# Patient Record
Sex: FEMALE | Race: WHITE | NOT HISPANIC OR LATINO | URBAN - METROPOLITAN AREA
[De-identification: names, ages, dates, MRNs, and addresses within clinical notes are randomized per-mention and may not be internally consistent; named-entity substitution may affect disease eponyms.]

---

## 2023-06-30 ENCOUNTER — OFFICE VISIT (OUTPATIENT)
Dept: PRIMARY CARE | Facility: CLINIC | Age: 28
End: 2023-06-30
Payer: COMMERCIAL

## 2023-06-30 VITALS
BODY MASS INDEX: 21.69 KG/M2 | HEART RATE: 93 BPM | SYSTOLIC BLOOD PRESSURE: 122 MMHG | HEIGHT: 66 IN | TEMPERATURE: 97.9 F | WEIGHT: 135 LBS | OXYGEN SATURATION: 99 % | DIASTOLIC BLOOD PRESSURE: 70 MMHG

## 2023-06-30 DIAGNOSIS — Z00.00 HEALTHCARE MAINTENANCE: Primary | ICD-10-CM

## 2023-06-30 DIAGNOSIS — F41.9 ANXIETY: ICD-10-CM

## 2023-06-30 DIAGNOSIS — Z12.4 CERVICAL CANCER SCREENING: ICD-10-CM

## 2023-06-30 LAB
POC APPEARANCE, URINE: CLEAR
POC BILIRUBIN, URINE: NEGATIVE
POC BLOOD, URINE: NEGATIVE
POC COLOR, URINE: YELLOW
POC GLUCOSE, URINE: NEGATIVE MG/DL
POC KETONES, URINE: NEGATIVE MG/DL
POC LEUKOCYTES, URINE: NEGATIVE
POC NITRITE,URINE: NEGATIVE
POC PH, URINE: 6 PH
POC PROTEIN, URINE: NEGATIVE MG/DL
POC SPECIFIC GRAVITY, URINE: >=1.03
POC UROBILINOGEN, URINE: 0.2 EU/DL

## 2023-06-30 PROCEDURE — 88175 CYTOPATH C/V AUTO FLUID REDO: CPT

## 2023-06-30 PROCEDURE — 99395 PREV VISIT EST AGE 18-39: CPT | Performed by: FAMILY MEDICINE

## 2023-06-30 PROCEDURE — 1036F TOBACCO NON-USER: CPT | Performed by: FAMILY MEDICINE

## 2023-06-30 PROCEDURE — 81002 URINALYSIS NONAUTO W/O SCOPE: CPT | Performed by: FAMILY MEDICINE

## 2023-06-30 RX ORDER — ESCITALOPRAM OXALATE 20 MG/1
1 TABLET ORAL DAILY
COMMUNITY
Start: 2018-06-11 | End: 2023-06-30 | Stop reason: SDUPTHER

## 2023-06-30 RX ORDER — ESCITALOPRAM OXALATE 20 MG/1
20 TABLET ORAL DAILY
Qty: 90 TABLET | Refills: 1 | Status: SHIPPED | OUTPATIENT
Start: 2023-06-30 | End: 2024-06-29

## 2023-06-30 RX ORDER — NORETHINDRONE ACETATE AND ETHINYL ESTRADIOL AND FERROUS FUMARATE 1MG-20(24)
1 KIT ORAL DAILY
Qty: 84 TABLET | Refills: 3 | Status: SHIPPED | OUTPATIENT
Start: 2023-06-30

## 2023-06-30 RX ORDER — NORETHINDRONE ACETATE AND ETHINYL ESTRADIOL AND FERROUS FUMARATE 1MG-20(24)
1 KIT ORAL DAILY
COMMUNITY
Start: 2020-12-29 | End: 2023-06-30 | Stop reason: SDUPTHER

## 2023-06-30 SDOH — ECONOMIC STABILITY: INCOME INSECURITY: IN THE LAST 12 MONTHS, WAS THERE A TIME WHEN YOU WERE NOT ABLE TO PAY THE MORTGAGE OR RENT ON TIME?: NO

## 2023-06-30 SDOH — ECONOMIC STABILITY: HOUSING INSECURITY
IN THE LAST 12 MONTHS, WAS THERE A TIME WHEN YOU DID NOT HAVE A STEADY PLACE TO SLEEP OR SLEPT IN A SHELTER (INCLUDING NOW)?: NO

## 2023-06-30 SDOH — ECONOMIC STABILITY: TRANSPORTATION INSECURITY
IN THE PAST 12 MONTHS, HAS LACK OF TRANSPORTATION KEPT YOU FROM MEETINGS, WORK, OR FROM GETTING THINGS NEEDED FOR DAILY LIVING?: NO

## 2023-06-30 SDOH — HEALTH STABILITY: PHYSICAL HEALTH: ON AVERAGE, HOW MANY MINUTES DO YOU ENGAGE IN EXERCISE AT THIS LEVEL?: 20 MIN

## 2023-06-30 SDOH — HEALTH STABILITY: PHYSICAL HEALTH: ON AVERAGE, HOW MANY DAYS PER WEEK DO YOU ENGAGE IN MODERATE TO STRENUOUS EXERCISE (LIKE A BRISK WALK)?: 5 DAYS

## 2023-06-30 SDOH — ECONOMIC STABILITY: TRANSPORTATION INSECURITY
IN THE PAST 12 MONTHS, HAS THE LACK OF TRANSPORTATION KEPT YOU FROM MEDICAL APPOINTMENTS OR FROM GETTING MEDICATIONS?: NO

## 2023-06-30 ASSESSMENT — SOCIAL DETERMINANTS OF HEALTH (SDOH)
WITHIN THE LAST YEAR, HAVE TO BEEN RAPED OR FORCED TO HAVE ANY KIND OF SEXUAL ACTIVITY BY YOUR PARTNER OR EX-PARTNER?: NO
HOW OFTEN DO YOU GET TOGETHER WITH FRIENDS OR RELATIVES?: TWICE A WEEK
IN A TYPICAL WEEK, HOW MANY TIMES DO YOU TALK ON THE PHONE WITH FAMILY, FRIENDS, OR NEIGHBORS?: TWICE A WEEK
WITHIN THE LAST YEAR, HAVE YOU BEEN AFRAID OF YOUR PARTNER OR EX-PARTNER?: NO
HOW OFTEN DO YOU ATTENT MEETINGS OF THE CLUB OR ORGANIZATION YOU BELONG TO?: NEVER
WITHIN THE LAST YEAR, HAVE YOU BEEN HUMILIATED OR EMOTIONALLY ABUSED IN OTHER WAYS BY YOUR PARTNER OR EX-PARTNER?: NO
HOW OFTEN DO YOU ATTEND CHURCH OR RELIGIOUS SERVICES?: NEVER
IN THE PAST 12 MONTHS, HAS THE ELECTRIC, GAS, OIL, OR WATER COMPANY THREATENED TO SHUT OFF SERVICE IN YOUR HOME?: NO
WITHIN THE LAST YEAR, HAVE YOU BEEN KICKED, HIT, SLAPPED, OR OTHERWISE PHYSICALLY HURT BY YOUR PARTNER OR EX-PARTNER?: NO
HOW HARD IS IT FOR YOU TO PAY FOR THE VERY BASICS LIKE FOOD, HOUSING, MEDICAL CARE, AND HEATING?: NOT HARD AT ALL
DO YOU BELONG TO ANY CLUBS OR ORGANIZATIONS SUCH AS CHURCH GROUPS UNIONS, FRATERNAL OR ATHLETIC GROUPS, OR SCHOOL GROUPS?: NO

## 2023-06-30 ASSESSMENT — LIFESTYLE VARIABLES
HOW OFTEN DO YOU HAVE A DRINK CONTAINING ALCOHOL: MONTHLY OR LESS
HOW MANY STANDARD DRINKS CONTAINING ALCOHOL DO YOU HAVE ON A TYPICAL DAY: PATIENT DOES NOT DRINK
HOW OFTEN DO YOU HAVE SIX OR MORE DRINKS ON ONE OCCASION: NEVER
AUDIT-C TOTAL SCORE: 1
SKIP TO QUESTIONS 9-10: 1

## 2023-06-30 ASSESSMENT — PATIENT HEALTH QUESTIONNAIRE - PHQ9
1. LITTLE INTEREST OR PLEASURE IN DOING THINGS: NOT AT ALL
2. FEELING DOWN, DEPRESSED OR HOPELESS: NOT AT ALL
SUM OF ALL RESPONSES TO PHQ9 QUESTIONS 1 & 2: 0

## 2023-06-30 NOTE — PATIENT INSTRUCTIONS
Today we performed your Annual Physical Exam.  Your preventative health care, labs and vaccinations have been reviewed and are up to date.      Your vaccines are up to date.    Follow up in 1 year for your Complete Physical Exam

## 2023-06-30 NOTE — PROGRESS NOTES
"Subjective   Patient ID: Zayda Dotson is a 27 y.o. female who presents for Annual Exam.    Dentist and Eye Doctor appointments: UTD  Immunizations: UTD  Diet: eats healthy  Exercise: walks her dog  Supplements: None  Menstrual Cycles:Regular  Sexually Active: Yes, Male, condoms  Pregnancy History: G0  Cancer Screening:    Cervical: Normal, ascus in the past    Breast:N/A   Colon: N/A   Skin:wears sunscreen   DEXA:n/a         HPI     Review of Systems    Objective   /70 (BP Location: Right arm, Patient Position: Sitting, BP Cuff Size: Adult)   Pulse 93   Temp 36.6 °C (97.9 °F) (Temporal)   Ht 1.676 m (5' 6\")   Wt 61.2 kg (135 lb)   LMP 06/12/2023 (Approximate)   SpO2 99%   Breastfeeding No   BMI 21.79 kg/m²     Physical Exam  Constitutional:       General: She is not in acute distress.     Appearance: She is well-developed. She is not diaphoretic.   HENT:      Head: Normocephalic and atraumatic.      Right Ear: Tympanic membrane normal.      Left Ear: Tympanic membrane normal.      Nose: Nose normal.      Mouth/Throat:      Mouth: Mucous membranes are moist.   Eyes:      General: No scleral icterus.     Pupils: Pupils are equal, round, and reactive to light.   Neck:      Thyroid: No thyromegaly.      Vascular: No JVD.   Cardiovascular:      Rate and Rhythm: Normal rate and regular rhythm.      Heart sounds: Normal heart sounds. No murmur heard.     No friction rub. No gallop.   Pulmonary:      Effort: Pulmonary effort is normal. No respiratory distress.      Breath sounds: Normal breath sounds. No wheezing or rales.   Chest:      Chest wall: No tenderness.   Abdominal:      General: Bowel sounds are normal. There is no distension.      Palpations: Abdomen is soft. There is no mass.      Tenderness: There is no abdominal tenderness. There is no rebound.   Musculoskeletal:         General: Normal range of motion.      Cervical back: Normal range of motion and neck supple.   Lymphadenopathy:      " Cervical: No cervical adenopathy.   Skin:     General: Skin is warm and dry.   Neurological:      General: No focal deficit present.      Mental Status: She is alert and oriented to person, place, and time.      Deep Tendon Reflexes: Reflexes normal.   Psychiatric:         Mood and Affect: Mood normal.         Behavior: Behavior normal.         Assessment/Plan   Diagnoses and all orders for this visit:  Healthcare maintenance  -     POCT UA (nonautomated) manually resulted  -     Junel Fe 24 1 mg-20 mcg (24)/75 mg (4) tablet; Take 1 tablet by mouth once daily.  Cervical cancer screening  -     THINPREP PAP TEST  Anxiety  -     escitalopram (Lexapro) 20 mg tablet; Take 1 tablet (20 mg) by mouth once daily.

## 2023-07-10 LAB
COMPLETE PATHOLOGY REPORT: NORMAL
CONVERTED CLINICAL DIAGNOSIS-HISTORY: NORMAL
CONVERTED DIAGNOSIS COMMENT: NORMAL
CONVERTED FINAL DIAGNOSIS: NORMAL
CONVERTED FINAL REPORT PDF LINK TO COPY AND PASTE: NORMAL

## 2023-11-02 ENCOUNTER — ANCILLARY PROCEDURE (OUTPATIENT)
Dept: RADIOLOGY | Facility: CLINIC | Age: 28
End: 2023-11-02
Payer: COMMERCIAL

## 2023-11-02 DIAGNOSIS — J06.9 ACUTE UPPER RESPIRATORY INFECTION: ICD-10-CM

## 2023-11-02 PROCEDURE — 71046 X-RAY EXAM CHEST 2 VIEWS: CPT | Performed by: RADIOLOGY

## 2023-11-02 PROCEDURE — 71046 X-RAY EXAM CHEST 2 VIEWS: CPT | Mod: FY

## 2024-07-01 ENCOUNTER — APPOINTMENT (OUTPATIENT)
Dept: PRIMARY CARE | Facility: CLINIC | Age: 29
End: 2024-07-01
Payer: COMMERCIAL

## 2024-07-01 ENCOUNTER — LAB (OUTPATIENT)
Dept: LAB | Facility: LAB | Age: 29
End: 2024-07-01
Payer: COMMERCIAL

## 2024-07-01 VITALS
HEART RATE: 73 BPM | WEIGHT: 136 LBS | OXYGEN SATURATION: 99 % | SYSTOLIC BLOOD PRESSURE: 102 MMHG | TEMPERATURE: 98.2 F | RESPIRATION RATE: 16 BRPM | DIASTOLIC BLOOD PRESSURE: 68 MMHG | HEIGHT: 66 IN | BODY MASS INDEX: 21.86 KG/M2

## 2024-07-01 DIAGNOSIS — F41.9 ANXIETY: ICD-10-CM

## 2024-07-01 DIAGNOSIS — Z00.00 HEALTHCARE MAINTENANCE: Primary | ICD-10-CM

## 2024-07-01 DIAGNOSIS — Z00.00 HEALTHCARE MAINTENANCE: ICD-10-CM

## 2024-07-01 LAB
25(OH)D3 SERPL-MCNC: 34 NG/ML (ref 30–100)
ALBUMIN SERPL BCP-MCNC: 4.6 G/DL (ref 3.4–5)
ALP SERPL-CCNC: 44 U/L (ref 33–110)
ALT SERPL W P-5'-P-CCNC: 11 U/L (ref 7–45)
ANION GAP SERPL CALC-SCNC: 12 MMOL/L (ref 10–20)
AST SERPL W P-5'-P-CCNC: 16 U/L (ref 9–39)
BILIRUB SERPL-MCNC: 0.7 MG/DL (ref 0–1.2)
BUN SERPL-MCNC: 14 MG/DL (ref 6–23)
CALCIUM SERPL-MCNC: 9.2 MG/DL (ref 8.6–10.3)
CHLORIDE SERPL-SCNC: 104 MMOL/L (ref 98–107)
CHOLEST SERPL-MCNC: 192 MG/DL (ref 0–199)
CHOLESTEROL/HDL RATIO: 2.4
CO2 SERPL-SCNC: 27 MMOL/L (ref 21–32)
CREAT SERPL-MCNC: 0.73 MG/DL (ref 0.5–1.05)
EGFRCR SERPLBLD CKD-EPI 2021: >90 ML/MIN/1.73M*2
ERYTHROCYTE [DISTWIDTH] IN BLOOD BY AUTOMATED COUNT: 12.6 % (ref 11.5–14.5)
GLUCOSE SERPL-MCNC: 74 MG/DL (ref 74–99)
HCT VFR BLD AUTO: 41.1 % (ref 36–46)
HDLC SERPL-MCNC: 78.6 MG/DL
HGB BLD-MCNC: 13.8 G/DL (ref 12–16)
LDLC SERPL CALC-MCNC: 95 MG/DL
MCH RBC QN AUTO: 31.4 PG (ref 26–34)
MCHC RBC AUTO-ENTMCNC: 33.6 G/DL (ref 32–36)
MCV RBC AUTO: 94 FL (ref 80–100)
NON HDL CHOLESTEROL: 113 MG/DL (ref 0–149)
NRBC BLD-RTO: 0 /100 WBCS (ref 0–0)
PLATELET # BLD AUTO: 197 X10*3/UL (ref 150–450)
POC APPEARANCE, URINE: CLEAR
POC BILIRUBIN, URINE: NEGATIVE
POC BLOOD, URINE: NEGATIVE
POC COLOR, URINE: YELLOW
POC GLUCOSE, URINE: NEGATIVE MG/DL
POC KETONES, URINE: NEGATIVE MG/DL
POC LEUKOCYTES, URINE: NEGATIVE
POC NITRITE,URINE: NEGATIVE
POC PH, URINE: 6 PH
POC PROTEIN, URINE: NEGATIVE MG/DL
POC SPECIFIC GRAVITY, URINE: 1.02
POC UROBILINOGEN, URINE: 0.2 EU/DL
POTASSIUM SERPL-SCNC: 4.8 MMOL/L (ref 3.5–5.3)
PROT SERPL-MCNC: 7.6 G/DL (ref 6.4–8.2)
RBC # BLD AUTO: 4.39 X10*6/UL (ref 4–5.2)
SODIUM SERPL-SCNC: 138 MMOL/L (ref 136–145)
TRIGL SERPL-MCNC: 93 MG/DL (ref 0–149)
TSH SERPL-ACNC: 2.44 MIU/L (ref 0.44–3.98)
VLDL: 19 MG/DL (ref 0–40)
WBC # BLD AUTO: 5.4 X10*3/UL (ref 4.4–11.3)

## 2024-07-01 PROCEDURE — 36415 COLL VENOUS BLD VENIPUNCTURE: CPT

## 2024-07-01 PROCEDURE — 84443 ASSAY THYROID STIM HORMONE: CPT

## 2024-07-01 PROCEDURE — 85027 COMPLETE CBC AUTOMATED: CPT

## 2024-07-01 PROCEDURE — 80053 COMPREHEN METABOLIC PANEL: CPT

## 2024-07-01 PROCEDURE — 81002 URINALYSIS NONAUTO W/O SCOPE: CPT | Performed by: FAMILY MEDICINE

## 2024-07-01 PROCEDURE — 1036F TOBACCO NON-USER: CPT | Performed by: FAMILY MEDICINE

## 2024-07-01 PROCEDURE — 82306 VITAMIN D 25 HYDROXY: CPT

## 2024-07-01 PROCEDURE — 99395 PREV VISIT EST AGE 18-39: CPT | Performed by: FAMILY MEDICINE

## 2024-07-01 PROCEDURE — 80061 LIPID PANEL: CPT

## 2024-07-01 RX ORDER — ESCITALOPRAM OXALATE 20 MG/1
20 TABLET ORAL DAILY
Qty: 90 TABLET | Refills: 3 | Status: SHIPPED | OUTPATIENT
Start: 2024-07-01 | End: 2025-07-01

## 2024-07-01 NOTE — PATIENT INSTRUCTIONS
Today we performed your Annual Physical Exam.  Your preventative health care, labs and vaccinations have been reviewed and are up to date.      Your vaccines are up to date.     Today we followed up on your anxiety.  You are doing great on your current medication.  Refills Sent.  Consider CBT/therapy to help with other underlying issues if needed.      Follow up in 1 year for your Complete Physical Exam

## 2024-07-01 NOTE — PROGRESS NOTES
"Subjective   Patient ID: Zayda Dotson is a 28 y.o. female who presents for Annual Exam.    Dentist and Eye Doctor appointments: UTD  Immunizations: UTD  Diet: Likes to eat healthy  Exercise: biking, walking her dog, stays active  Supplements: None  Menstrual Cycles: Regular  Sexually Active: Yes, Male, No STD concerns  Pregnancy History:g0  Cancer Screening:    Cervical: 2023 - due in 2026 with hpv cotesting   Breast: N/A   Colon: N/A   Skin: No concerns    DEXA:N/A        HPI     Review of Systems    Objective   /68   Pulse 73   Temp 36.8 °C (98.2 °F)   Resp 16   Ht 1.676 m (5' 6\")   Wt 61.7 kg (136 lb)   LMP 06/14/2024 Comment: last pap 6/2023 PCP  SpO2 99%   BMI 21.95 kg/m²     Physical Exam  Constitutional:       General: She is not in acute distress.     Appearance: She is well-developed. She is not diaphoretic.   HENT:      Head: Normocephalic and atraumatic.      Right Ear: Tympanic membrane normal.      Left Ear: Tympanic membrane normal.      Nose: Nose normal.      Mouth/Throat:      Mouth: Mucous membranes are moist.   Eyes:      General: No scleral icterus.     Pupils: Pupils are equal, round, and reactive to light.   Neck:      Thyroid: No thyromegaly.      Vascular: No JVD.   Cardiovascular:      Rate and Rhythm: Normal rate and regular rhythm.      Heart sounds: Normal heart sounds. No murmur heard.     No friction rub. No gallop.   Pulmonary:      Effort: Pulmonary effort is normal. No respiratory distress.      Breath sounds: Normal breath sounds. No wheezing or rales.   Chest:      Chest wall: No tenderness.   Breasts:     Right: Normal.      Left: Normal.   Abdominal:      General: Bowel sounds are normal. There is no distension.      Palpations: Abdomen is soft. There is no mass.      Tenderness: There is no abdominal tenderness. There is no rebound.   Musculoskeletal:         General: Normal range of motion.      Cervical back: Normal range of motion and neck supple. "   Lymphadenopathy:      Cervical: No cervical adenopathy.   Skin:     General: Skin is warm and dry.   Neurological:      General: No focal deficit present.      Mental Status: She is alert and oriented to person, place, and time.      Deep Tendon Reflexes: Reflexes normal.   Psychiatric:         Mood and Affect: Mood normal.         Behavior: Behavior normal.         Assessment/Plan   Diagnoses and all orders for this visit:  Healthcare maintenance  -     POCT UA (nonautomated) manually resulted  -     CBC; Future  -     Comprehensive Metabolic Panel; Future  -     Lipid Panel; Future  -     Vitamin D 25 hydroxy; Future  -     TSH with reflex to Free T4 if abnormal; Future  Anxiety  -     escitalopram (Lexapro) 20 mg tablet; Take 1 tablet (20 mg) by mouth once daily.

## 2024-07-18 ENCOUNTER — TELEPHONE (OUTPATIENT)
Dept: PRIMARY CARE | Facility: CLINIC | Age: 29
End: 2024-07-18
Payer: COMMERCIAL

## 2024-07-18 NOTE — TELEPHONE ENCOUNTER
Patient is going on a cruise and would like to know if you would send in Scopolamine Patches for her.    [facility]/pharmacy #[id number] AT [address]    [address] [address]  [facility] [address] [phone]

## 2024-07-19 DIAGNOSIS — T75.3XXS MOTION SICKNESS, SEQUELA: Primary | ICD-10-CM

## 2024-07-19 RX ORDER — SCOLOPAMINE TRANSDERMAL SYSTEM 1 MG/1
1 PATCH, EXTENDED RELEASE TRANSDERMAL
Qty: 10 PATCH | Refills: 0 | Status: SHIPPED | OUTPATIENT
Start: 2024-07-19 | End: 2024-09-17

## 2024-09-11 ENCOUNTER — TELEPHONE (OUTPATIENT)
Dept: PRIMARY CARE | Facility: CLINIC | Age: 29
End: 2024-09-11
Payer: COMMERCIAL

## 2024-09-11 NOTE — TELEPHONE ENCOUNTER
Patient left a message. She found out about a week ago that she is pregnant. She has her initial ob visit scheduled but is wondering if it is ok to continue taking Lexapro? If not, she would like to discuss other options.     Please advise

## 2024-10-03 ENCOUNTER — APPOINTMENT (OUTPATIENT)
Dept: OBSTETRICS AND GYNECOLOGY | Facility: CLINIC | Age: 29
End: 2024-10-03
Payer: COMMERCIAL

## 2024-10-03 ENCOUNTER — LAB (OUTPATIENT)
Dept: LAB | Facility: LAB | Age: 29
End: 2024-10-03
Payer: COMMERCIAL

## 2024-10-03 VITALS — BODY MASS INDEX: 21.14 KG/M2 | WEIGHT: 131 LBS | DIASTOLIC BLOOD PRESSURE: 83 MMHG | SYSTOLIC BLOOD PRESSURE: 142 MMHG

## 2024-10-03 DIAGNOSIS — Z34.91 FIRST TRIMESTER PREGNANCY (HHS-HCC): ICD-10-CM

## 2024-10-03 DIAGNOSIS — Z3A.08 8 WEEKS GESTATION OF PREGNANCY (HHS-HCC): ICD-10-CM

## 2024-10-03 DIAGNOSIS — O10.011 PRE-EXISTING ESSENTIAL HYPERTENSION DURING PREGNANCY IN FIRST TRIMESTER (HHS-HCC): ICD-10-CM

## 2024-10-03 DIAGNOSIS — F41.9 ANXIETY: ICD-10-CM

## 2024-10-03 DIAGNOSIS — Z34.01 ENCOUNTER FOR PRENATAL CARE IN FIRST TRIMESTER OF FIRST PREGNANCY (HHS-HCC): Primary | ICD-10-CM

## 2024-10-03 PROBLEM — R03.0 ELEVATED BLOOD PRESSURE READING IN OFFICE WITHOUT DIAGNOSIS OF HYPERTENSION: Status: ACTIVE | Noted: 2024-10-03

## 2024-10-03 PROBLEM — O99.340 ANXIETY DISORDER AFFECTING PREGNANCY, ANTEPARTUM (HHS-HCC): Status: ACTIVE | Noted: 2024-10-03

## 2024-10-03 LAB
ABO GROUP (TYPE) IN BLOOD: NORMAL
ALBUMIN SERPL BCP-MCNC: 4.4 G/DL (ref 3.4–5)
ALP SERPL-CCNC: 34 U/L (ref 33–110)
ALT SERPL W P-5'-P-CCNC: 8 U/L (ref 7–45)
ANION GAP SERPL CALC-SCNC: 10 MMOL/L (ref 10–20)
ANTIBODY SCREEN: NORMAL
AST SERPL W P-5'-P-CCNC: 12 U/L (ref 9–39)
BILIRUB SERPL-MCNC: 0.7 MG/DL (ref 0–1.2)
BUN SERPL-MCNC: 8 MG/DL (ref 6–23)
CALCIUM SERPL-MCNC: 9.4 MG/DL (ref 8.6–10.3)
CHLORIDE SERPL-SCNC: 101 MMOL/L (ref 98–107)
CO2 SERPL-SCNC: 28 MMOL/L (ref 21–32)
CREAT SERPL-MCNC: 0.55 MG/DL (ref 0.5–1.05)
EGFRCR SERPLBLD CKD-EPI 2021: >90 ML/MIN/1.73M*2
ERYTHROCYTE [DISTWIDTH] IN BLOOD BY AUTOMATED COUNT: 12.3 % (ref 11.5–14.5)
EST. AVERAGE GLUCOSE BLD GHB EST-MCNC: 68 MG/DL
GLUCOSE SERPL-MCNC: 68 MG/DL (ref 74–99)
HBA1C MFR BLD: 4 %
HBV SURFACE AG SERPL QL IA: NONREACTIVE
HCT VFR BLD AUTO: 40.4 % (ref 36–46)
HCV AB SER QL: NONREACTIVE
HGB BLD-MCNC: 13.8 G/DL (ref 12–16)
HIV 1+2 AB+HIV1 P24 AG SERPL QL IA: NONREACTIVE
MCH RBC QN AUTO: 31 PG (ref 26–34)
MCHC RBC AUTO-ENTMCNC: 34.2 G/DL (ref 32–36)
MCV RBC AUTO: 91 FL (ref 80–100)
NRBC BLD-RTO: 0 /100 WBCS (ref 0–0)
PLATELET # BLD AUTO: 223 X10*3/UL (ref 150–450)
POTASSIUM SERPL-SCNC: 4.3 MMOL/L (ref 3.5–5.3)
PREGNANCY TEST URINE, POC: POSITIVE
PROT SERPL-MCNC: 7.4 G/DL (ref 6.4–8.2)
RBC # BLD AUTO: 4.45 X10*6/UL (ref 4–5.2)
REFLEX ADDED, ANEMIA PANEL: NORMAL
RH FACTOR (ANTIGEN D): NORMAL
RUBV IGG SERPL IA-ACNC: 0.9 IA
RUBV IGG SERPL QL IA: NORMAL
SODIUM SERPL-SCNC: 135 MMOL/L (ref 136–145)
TREPONEMA PALLIDUM IGG+IGM AB [PRESENCE] IN SERUM OR PLASMA BY IMMUNOASSAY: NONREACTIVE
WBC # BLD AUTO: 6.9 X10*3/UL (ref 4.4–11.3)

## 2024-10-03 PROCEDURE — 87661 TRICHOMONAS VAGINALIS AMPLIF: CPT

## 2024-10-03 PROCEDURE — 87389 HIV-1 AG W/HIV-1&-2 AB AG IA: CPT

## 2024-10-03 PROCEDURE — 83021 HEMOGLOBIN CHROMOTOGRAPHY: CPT

## 2024-10-03 PROCEDURE — 36415 COLL VENOUS BLD VENIPUNCTURE: CPT

## 2024-10-03 PROCEDURE — 85027 COMPLETE CBC AUTOMATED: CPT

## 2024-10-03 PROCEDURE — 87491 CHLMYD TRACH DNA AMP PROBE: CPT

## 2024-10-03 PROCEDURE — 84156 ASSAY OF PROTEIN URINE: CPT

## 2024-10-03 PROCEDURE — 87340 HEPATITIS B SURFACE AG IA: CPT

## 2024-10-03 PROCEDURE — 83020 HEMOGLOBIN ELECTROPHORESIS: CPT

## 2024-10-03 PROCEDURE — 82570 ASSAY OF URINE CREATININE: CPT

## 2024-10-03 PROCEDURE — 86803 HEPATITIS C AB TEST: CPT

## 2024-10-03 PROCEDURE — 87086 URINE CULTURE/COLONY COUNT: CPT

## 2024-10-03 PROCEDURE — 87591 N.GONORRHOEAE DNA AMP PROB: CPT

## 2024-10-03 PROCEDURE — 86317 IMMUNOASSAY INFECTIOUS AGENT: CPT

## 2024-10-03 PROCEDURE — 86901 BLOOD TYPING SEROLOGIC RH(D): CPT

## 2024-10-03 PROCEDURE — 80053 COMPREHEN METABOLIC PANEL: CPT

## 2024-10-03 PROCEDURE — 86900 BLOOD TYPING SEROLOGIC ABO: CPT

## 2024-10-03 PROCEDURE — 83036 HEMOGLOBIN GLYCOSYLATED A1C: CPT

## 2024-10-03 PROCEDURE — 86850 RBC ANTIBODY SCREEN: CPT

## 2024-10-03 PROCEDURE — 86780 TREPONEMA PALLIDUM: CPT

## 2024-10-03 NOTE — PROGRESS NOTES
"Assessment/Plan     Routine Prenatal Care  - Patient appropriate for and desires midwifery service  - Oriented to practice, including available collaboration and consulting with physicians.  - Discussed routine OB labs, including serum STI/HIV, CBC, blood type and screen.   - Education provided r/t nutrition, folic acid supplementation, dietary guidelines, exercise, travel, smoking, alcohol, caffeine, and drug use.  - Dating ultrasound deferred; pt with sure LMP and history of regular menstrual cycles.  - Pt counseled on genetic testing options and provided literature in the obstetric folder. First line screening options, including cffDNA and NT ultrasound, were discussed and offered.  Benefits, drawbacks, and limitations explained. Pt interested in both - orders placed.   - Warning s/s discussed and SAB precautions reviewed. Pt provided office phone number and instructed on when to call.    Elevated BP without diagnosis of hypertension  - 142/83 today, pt denies known history of chronic hypertension  - baseline CMP, PCR added to routine OB labs    Nausea During Pregnancy  - Discussed lifestyle modifications including small frequent meals, keeping dry crackers at bedside to eat upon awakening  - Discussed complementary treatments including peppermint and ginger products   - Discussed Vitamin B6 and Unisom as needed, pt opts to purchase OTC    Pre-pregnancy BMI 20.99   - Normal weight in Pregnancy - BMI 18.5-24.9; weight gain of 25-35lbs through healthy eating habits reviewed. Recommended \"Real Food for Pregnancy\" by Edilma Chiacs.    ASA Prophylaxis  - Pt does not meet criteria for ASA prophylaxis. Will consider if BP remains elevated at subsequent visits.    Health Maintenance  - counseled on seatbelt use, continued bi-annual visits for dental care, and annual visits with PCP  - Flu: Not yet vaccinated for 0790-2708 season; pt accepts vaccination today    Anxiety and Depression   - Pt admits to situational stressors " "including demands at work, hormonal changes, and nausea and vomiting due to the pregnancy  - Mood stable on 20mg Lexapro daily; discussed risks / benefits of SSRI use during pregnancy. Referred to \"MothertoBaby\" website for additional information. Pt opts to continue Lexapro.   - Does not see therapist currently, accepts referral to OBGYN Behavioral Health    Cervical Cancer Screening  - PAP up to date  - ASCCP guidelines reviewed with patient; next PAP due 2026    F/U 4 weeks. Review results of NOB labs. Collect cffDNA. Ensure NT scan is scheduled. Send Rx for bASA and discuss implications of possible CHTN if BP remains elevated.     EDBBY Juarez-DAVID    Subjective     Zayda Dotson is a 29 y.o.  at 8w1d with a working estimated date of delivery of 2025, by Last Menstrual Period who presents for an initial prenatal visit. This pregnancy is planned.     Partner:  Kuldip  Employment: Teacher    Patient currently experiencing: Nausea, Vomiting    LMP: certain 24; Prior menstrual cycle regular q 28 days.   Bleeding or cramping since LMP: no  Taking prenatal vitamin: Yes  Ultrasound completed this pregnancy: No    OB History    Para Term  AB Living   1             SAB IAB Ectopic Multiple Live Births                  # Outcome Date GA Lbr Conor/2nd Weight Sex Type Anes PTL Lv   1 Current                 Prior pregnancy complications: N/A    Preeclampsia Risk  Moderate risk factors include: Nulliparity. High risk factors include: None.    PAP History: last PAP 23 nml    Past Medical History:   Diagnosis Date    Abnormal Pap smear of cervix 2017    Anxiety 2023    Depression       Past Surgical History:   Procedure Laterality Date    OTHER SURGICAL HISTORY  2020    No history of surgery      Social History     Tobacco Use    Smoking status: Never    Smokeless tobacco: Never   Vaping Use    Vaping status: Never Used   Substance Use Topics    Alcohol use: Not " Currently     Alcohol/week: 5.0 standard drinks of alcohol     Types: 5 Glasses of wine per week     Comment: socially    Drug use: Never      Current Medications: 20mg Lexapro, PNV    Objective     /83   Wt 59.4 kg (131 lb)   LMP 08/07/2024 Comment: last pap 6/2023 PCP  BMI 21.14 kg/m²     Physical Exam  Vitals reviewed.   Constitutional:       General: She is not in acute distress.     Appearance: Normal appearance.   HENT:      Head: Normocephalic and atraumatic.   Cardiovascular:      Rate and Rhythm: Normal rate and regular rhythm.      Heart sounds: Normal heart sounds, S1 normal and S2 normal.   Pulmonary:      Effort: Pulmonary effort is normal.      Breath sounds: Normal breath sounds.   Abdominal:      General: Abdomen is flat.      Palpations: Abdomen is soft.      Tenderness: There is no abdominal tenderness.      Comments: Gravid uterus   Musculoskeletal:         General: Normal range of motion.      Cervical back: Normal range of motion.   Skin:     General: Skin is warm and dry.   Neurological:      Mental Status: She is alert and oriented to person, place, and time.   Psychiatric:         Mood and Affect: Mood normal.         Behavior: Behavior normal.         Thought Content: Thought content normal.         Judgment: Judgment normal.        Postpartum Depression: Not on file      Pregnancy Problems (from 10/03/24 to present)       Problem Noted Resolved    8 weeks gestation of pregnancy ([facility]) 10/3/2024 by FREDDY Juarez No    Priority:  Medium      Overview Addendum 10/3/2024  8:49 AM by FREDDY Juarez     Desired provider in labor: [x] CNM  [] Physician  [x] Blood Products: [x] Yes, accepts [] No, needs counseling  [x] Initial BMI: 20.99   [] Prenatal Labs:   [x] Cervical Cancer Screening up to date: next due 6/2026  [] Rh status:   [] Genetic Screening:    [] NT US: (11-13 wks)  [] Baby ASA (if indicated):  [] Pregnancy dated by:     [] Anatomy US: (19-20  wks)  [] Federal Sterilization consent signed (if indicated):  [] 1hr GCT at 24-28wks:  [] Rhogam (if indicated):   [] Fetal Surveillance (if indicated):  [] Tdap (27-32 wks, may be given up to 36 wks if initial window missed):   [] RSV (32-36 wks) (Sept. to end of Jan):   [x] Flu Vaccine: 10/3    [] Breastfeeding:  [] Postpartum Birth control method:   [] GBS at 36 - 37 wks:  [] 39 weeks discussion of IOL vs. Expectant management:  [x] Mode of delivery ( anticipated ): Vaginal           Elevated blood pressure reading in office without diagnosis of hypertension 10/3/2024 by FREDDY Juarez No    Priority:  Medium      Overview Signed 10/3/2024  8:18 AM by FREDDY Juarez     /83 at NOB visit, 8w1d gestation  Baseline PEC labs ordered         Anxiety disorder affecting pregnancy, antepartum ([facility]) 10/3/2024 by FREDDY Juarez No    Priority:  Medium      Overview Signed 10/3/2024  8:50 AM by FREDDY Juarez     Pt takes 20mg Lexapro daily  Referred to OBGYN Behavioral Health

## 2024-10-04 LAB
CREAT UR-MCNC: 131.8 MG/DL (ref 20–320)
PROT UR-ACNC: 16 MG/DL (ref 5–24)
PROT/CREAT UR: 0.12 MG/MG CREAT (ref 0–0.17)

## 2024-10-05 PROBLEM — O26.899 RH NEGATIVE STATE IN ANTEPARTUM PERIOD (HHS-HCC): Status: ACTIVE | Noted: 2024-10-05

## 2024-10-05 PROBLEM — Z67.91 RH NEGATIVE STATE IN ANTEPARTUM PERIOD (HHS-HCC): Status: ACTIVE | Noted: 2024-10-05

## 2024-10-05 PROBLEM — Z78.9 NOT IMMUNE TO RUBELLA: Status: ACTIVE | Noted: 2024-10-05

## 2024-10-05 LAB
BACTERIA UR CULT: NORMAL
C TRACH RRNA SPEC QL NAA+PROBE: NEGATIVE
N GONORRHOEA DNA SPEC QL PROBE+SIG AMP: NEGATIVE
T VAGINALIS RRNA SPEC QL NAA+PROBE: NEGATIVE

## 2024-10-08 LAB
HEMOGLOBIN A2: 3 % (ref 2–3.5)
HEMOGLOBIN A: 96.8 % (ref 95.8–98)
HEMOGLOBIN F: 0.2 % (ref 0–2)
HEMOGLOBIN IDENTIFICATION INTERPRETATION: NORMAL
PATH REVIEW-HGB IDENTIFICATION: NORMAL

## 2024-10-31 ENCOUNTER — APPOINTMENT (OUTPATIENT)
Dept: OBSTETRICS AND GYNECOLOGY | Facility: CLINIC | Age: 29
End: 2024-10-31

## 2024-10-31 VITALS — WEIGHT: 137 LBS | DIASTOLIC BLOOD PRESSURE: 84 MMHG | BODY MASS INDEX: 22.11 KG/M2 | SYSTOLIC BLOOD PRESSURE: 128 MMHG

## 2024-10-31 DIAGNOSIS — Z3A.12 12 WEEKS GESTATION OF PREGNANCY (HHS-HCC): Primary | ICD-10-CM

## 2024-10-31 DIAGNOSIS — Z34.01 ENCOUNTER FOR PRENATAL CARE IN FIRST TRIMESTER OF FIRST PREGNANCY (HHS-HCC): ICD-10-CM

## 2024-10-31 PROCEDURE — 0501F PRENATAL FLOW SHEET: CPT

## 2024-11-05 ENCOUNTER — HOSPITAL ENCOUNTER (OUTPATIENT)
Dept: RADIOLOGY | Facility: HOSPITAL | Age: 29
Discharge: HOME | End: 2024-11-05
Payer: COMMERCIAL

## 2024-11-05 ENCOUNTER — APPOINTMENT (OUTPATIENT)
Dept: LAB | Facility: LAB | Age: 29
End: 2024-11-05
Payer: COMMERCIAL

## 2024-11-05 DIAGNOSIS — Z34.91 FIRST TRIMESTER PREGNANCY (HHS-HCC): ICD-10-CM

## 2024-11-05 PROCEDURE — 76813 OB US NUCHAL MEAS 1 GEST: CPT | Performed by: OBSTETRICS & GYNECOLOGY

## 2024-11-05 PROCEDURE — 76813 OB US NUCHAL MEAS 1 GEST: CPT

## 2024-11-06 ENCOUNTER — TELEPHONE (OUTPATIENT)
Dept: OBSTETRICS AND GYNECOLOGY | Facility: CLINIC | Age: 29
End: 2024-11-06

## 2024-11-06 NOTE — TELEPHONE ENCOUNTER
13.0 wk ob called ob line c/o vomiting every hour from 2 pm yesterday till around 10 pm last night.  She was able to sleep throughout the night.  This morning she attempted to push fluids to rehydrate but ended up getting sick within minutes of drinking.  Throughout the day she has been able to keep small sips of fluids down, a little each hour and has not vomited since this morning.  Patient denies diarrhea but isn't sure if she had a fever last night or not, as her thermometer isn't working.  She did have bouts of chills and then sweating throughout the night.  She is also experiencing a bad headache but thinks that is due to dehydration and not eating. Per patient, she is urinating like normal.    Patient encouraged to try pushing fluids with extra electrolytes in it, such as Pedialyte, Gatorade, propel or even liquid IV.      Patient advised to call office back with any further questions or concern.    Sent the following TRUE linkswear message as well:    I'm sorry to hear your not feeling well, hopefully you start feeling better come tomorrow.      It's important to try to push fluids to prevent dehydration.  Drinking something with extra electrolytes such as Propel, Gatorade, PowerAde, Pedialyte, Liquid IV are great but you can also drink Ginger ale or even Sprite.  I would try sipping on fluids at room temperature, as opposed to super cold to avoid shocking your stomach causing you to get sick again.       If you do start experiencing diarrhea, you can take Imodium, per box instructions, to help get your diarrhea under control.  Once you feel like you are able to eat something I would stick with the BRAT diet, which is Bananas; Rice; Applesauce and Toast.  These foods are helpful on replenishing what you loose when having diarrhea.       You can take tylenol, per box instructions for fever and/or headache.     As we discussed, headaches can be due to multiple reasons such as weather, low blood sugar, dehydration,  pregnancy hormones or even blood pressure.     You should make sure you are drinking at least 2-3 liters of water a day, to stay hydrated.  If you normally drink caffeine and have stopped due to pregnancy, this may be a caffeine withdrawal headache.  Caffeine is ok to have in pregnancy as long as you don't have more than 250 mg a day.       You can take the following:     Magnesium 400 mg daily with prenatal vitamins to prevent reoccurring   If you still experience a headache, you can take Tylenol 1000 mg plus over the counter Magnesium 800 mg with a cup of coffee (an isolated cup of coffee/pop is safe and recommended for headache support measures)   Tylenol 1000mg every 6 hours or per box instructions  If Tylenol isn't helping that you can take Regular Excedrin or Excedrin Tension     Hopefully you start feeling better soon.     Please reach out with any questions.     Nu Eastman  OB Navigator

## 2024-11-14 LAB
COMMENTS - MP RESULT TYPE: NORMAL
SCAN RESULT: NORMAL

## 2024-11-26 ENCOUNTER — APPOINTMENT (OUTPATIENT)
Dept: OBSTETRICS AND GYNECOLOGY | Facility: CLINIC | Age: 29
End: 2024-11-26
Payer: COMMERCIAL

## 2024-11-26 VITALS — WEIGHT: 136 LBS | SYSTOLIC BLOOD PRESSURE: 114 MMHG | DIASTOLIC BLOOD PRESSURE: 68 MMHG | BODY MASS INDEX: 21.95 KG/M2

## 2024-11-26 DIAGNOSIS — Z3A.15 15 WEEKS GESTATION OF PREGNANCY (HHS-HCC): ICD-10-CM

## 2024-11-26 DIAGNOSIS — Z34.02 ENCOUNTER FOR PRENATAL CARE IN SECOND TRIMESTER OF FIRST PREGNANCY (HHS-HCC): Primary | ICD-10-CM

## 2024-11-26 PROCEDURE — 0501F PRENATAL FLOW SHEET: CPT

## 2024-11-26 NOTE — PROGRESS NOTES
Anatomy U/S scheduled for 24    Chaperone declined: Marilyn Briceno, CM3      Assessment/Plan   29 y.o.  at 15w6d  - reviewed results of NT scan and cffDNA, both wnl  - encouraged pt to review prenatal classes offered through   - Anatomy scan scheduled for   - Routine prenatal care    Follow up in 4 weeks for next prenatal visit    FREDDY Juarez    Subjective     Zayda Dotson is a 29 y.o.  at 15w6d with a working estimated date of delivery of 2025, by Last Menstrual Period presenting for a routine prenatal visit. She is doing well, no concerns. She denies vaginal bleeding, leakage of fluid, or cramping. Not feeling FM yet.     Had stomach bug first week in November. Better now.   Getting a little anxious about childbirth, the unknown.     Her pregnancy is complicated by:  Pregnancy Problems (from 10/03/24 to present)       Problem Noted Diagnosed Resolved    Rh negative state in antepartum period ([facility]) 10/5/2024 by FREDDY Juarez  No    Priority:  Medium       Overview Signed 10/5/2024  1:45 PM by FREDDY Juarez     [] 28 week Rhogam         Not immune to rubella 10/5/2024 by FREDDY Juarez  No    Priority:  Medium       Overview Signed 10/5/2024  1:45 PM by FREDDY Juarez     Rubella equivocal  Will offer MMR postpartum         15 weeks gestation of pregnancy ([facility]) 10/3/2024 by FREDDY Juarez  No    Priority:  Medium       Overview Addendum 2024 10:22 AM by FREDDY Juarez     Desired provider in labor: [x] CNM  [] Physician  [x] Blood Products: [x] Yes, accepts [] No, needs counseling  [x] Initial BMI: 20.99   [x] Prenatal Labs: wnl with exception of rubella equivocal status  [x] Cervical Cancer Screening up to date: next due 2026  [x] Rh status: Neg  [x] Genetic Screening: cffdna wnl, girl  [x] NT US: (11-13 wks) 0.8mm wnl  [] Baby ASA (if indicated):  [x] Pregnancy dated by: LMP = 12w6d  scan    [] Anatomy US: (19-20 wks)  [] Federal Sterilization consent signed (if indicated):  [] 1hr GCT at 24-28wks:  [] Rhogam (if indicated):   [] Fetal Surveillance (if indicated):  [] Tdap (27-32 wks, may be given up to 36 wks if initial window missed):   [] RSV (32-36 wks) (Sept. to end ):   [x] Flu Vaccine: 10/3    [] Breastfeeding:  [] Postpartum Birth control method:   [] GBS at 36 - 37 wks:  [] 39 weeks discussion of IOL vs. Expectant management:  [x] Mode of delivery ( anticipated ): Vaginal           Elevated blood pressure reading in office without diagnosis of hypertension 10/3/2024 by FREDDY Juarez  No    Priority:  Medium       Overview Addendum 10/5/2024  1:45 PM by FREDDY Juarez     /83 at NOB visit, 8w1d gestation  Baseline PEC labs wnl         Anxiety disorder affecting pregnancy, antepartum ([facility]) 10/3/2024 by FREDDY Juarez  No    Priority:  Medium       Overview Signed 10/3/2024  8:50 AM by FREDDY Juarez     Pt takes 20mg Lexapro daily  Referred to OBGYN Behavioral Health                  Objective   Weight: 61.7 kg (136 lb)  TW.722 kg (6 lb)   Expected Total Weight Gain: 11.5 kg (25 lb)-16 kg (35 lb)   Pregravid BMI: 20.99  Pregravid Weight: 59 kg (130 lb)   BP: 114/68  Fetal Heart Rate: 141

## 2024-12-23 ENCOUNTER — HOSPITAL ENCOUNTER (OUTPATIENT)
Dept: RADIOLOGY | Facility: HOSPITAL | Age: 29
Discharge: HOME | End: 2024-12-23
Payer: COMMERCIAL

## 2024-12-23 DIAGNOSIS — Z34.91 FIRST TRIMESTER PREGNANCY (HHS-HCC): ICD-10-CM

## 2024-12-23 PROBLEM — Z3A.19 19 WEEKS GESTATION OF PREGNANCY (HHS-HCC): Status: ACTIVE | Noted: 2024-10-03

## 2024-12-23 PROCEDURE — 76805 OB US >/= 14 WKS SNGL FETUS: CPT

## 2024-12-23 PROCEDURE — 76805 OB US >/= 14 WKS SNGL FETUS: CPT | Performed by: OBSTETRICS & GYNECOLOGY

## 2024-12-23 NOTE — PROGRESS NOTES
Assessment/Plan   29 y.o.  at 19w6d  - Reviewed results of anatomy scan yesterday, wnl  - Discussed upcoming GCT/CBC to be performed between 24-28 weeks gestation  - Discussed plans for  feeding; Pt plans to breastfeed, has paperwork for pump but forgot to bring today  - Routine prenatal care    Follow up in 4 weeks for next prenatal visit. Collect GCT/CBC if greater than 24 weeks. Sign paperwork for breastpump.    FREDDY Juarez    Subjective     Zayda Dotson is a 29 y.o.  at 19w6d with a working estimated date of delivery of 2025, by Last Menstrual Period presenting for a routine prenatal visit. She is doing well, no concerns. She denies vaginal bleeding, leakage of fluid, contractions, or decreased fetal movement.    Lots of travel today and tomorrow for [place].     Her pregnancy is complicated by:  Pregnancy Problems (from 10/03/24 to present)       Problem Noted Diagnosed Resolved    Rh negative state in antepartum period ([facility]) 10/5/2024 by FREDDY Juarez  No    Priority:  Medium       Overview Signed 10/5/2024  1:45 PM by FREDDY Juarez     [] 28 week Rhogam         Not immune to rubella 10/5/2024 by FREDDY Juarez  No    Priority:  Medium       Overview Signed 10/5/2024  1:45 PM by FREDDY Juarez     Rubella equivocal  Will offer MMR postpartum         19 weeks gestation of pregnancy ([facility]) 10/3/2024 by FREDDY Juarez  No    Priority:  Medium       Overview Addendum 2024 11:56 AM by FREDDY Juarez     Desired provider in labor: [x] CNM  [] Physician  [x] Blood Products: [x] Yes, accepts [] No, needs counseling  [x] Initial BMI: 20.99   [x] Prenatal Labs: wnl with exception of rubella equivocal status  [x] Cervical Cancer Screening up to date: next due 2026  [x] Rh status: Neg  [x] Genetic Screening: cffdna wnl, girl  [x] NT US: (11-13 wks) 0.8mm wnl  [x] Baby ASA (if indicated): not  indicated  [x] Pregnancy dated by: LMP = 12w6d scan    [] Anatomy US: (19-20 wks)  [] Federal Sterilization consent signed (if indicated):  [] 1hr GCT at 24-28wks:  [] Rhogam (if indicated):   [] Fetal Surveillance (if indicated):  [] Tdap (27-32 wks, may be given up to 36 wks if initial window missed):   [] RSV (32-36 wks) (Sept. to end ):   [x] Flu Vaccine: 10/3    [] Breastfeeding:  [] Postpartum Birth control method:   [] GBS at 36 - 37 wks:  [] 36 week [facility] consult visit  [] 39 weeks discussion of IOL vs. Expectant management:  [x] Mode of delivery ( anticipated ): Vaginal           Elevated blood pressure reading in office without diagnosis of hypertension 10/3/2024 by FREDDY Juarez  No    Priority:  Medium       Overview Addendum 10/5/2024  1:45 PM by FREDDY Juarez     /83 at NOB visit, 8w1d gestation  Baseline PEC labs wnl         Anxiety disorder affecting pregnancy, antepartum ([facility]) 10/3/2024 by FREDDY Juarez  No    Priority:  Medium       Overview Signed 10/3/2024  8:50 AM by FREDDY Juarez     Pt takes 20mg Lexapro daily  Referred to OBGYN Behavioral Health                  Objective   Weight: 64.4 kg (142 lb)  TW.443 kg (12 lb)   Expected Total Weight Gain: 11.5 kg (25 lb)-16 kg (35 lb)   Pregravid BMI: 20.99  Pregravid Weight: 59 kg (130 lb)   BP: 110/62  Fetal Heart Rate: 135 Fundal Height (cm): 19 cm

## 2024-12-24 ENCOUNTER — APPOINTMENT (OUTPATIENT)
Dept: OBSTETRICS AND GYNECOLOGY | Facility: CLINIC | Age: 29
End: 2024-12-24
Payer: COMMERCIAL

## 2024-12-24 VITALS — SYSTOLIC BLOOD PRESSURE: 110 MMHG | DIASTOLIC BLOOD PRESSURE: 62 MMHG | WEIGHT: 142 LBS | BODY MASS INDEX: 22.92 KG/M2

## 2024-12-24 DIAGNOSIS — Z34.02 ENCOUNTER FOR PRENATAL CARE IN SECOND TRIMESTER OF FIRST PREGNANCY (HHS-HCC): ICD-10-CM

## 2024-12-24 DIAGNOSIS — Z3A.19 19 WEEKS GESTATION OF PREGNANCY (HHS-HCC): Primary | ICD-10-CM

## 2024-12-24 PROCEDURE — 0501F PRENATAL FLOW SHEET: CPT

## 2025-01-21 ENCOUNTER — APPOINTMENT (OUTPATIENT)
Dept: OBSTETRICS AND GYNECOLOGY | Facility: CLINIC | Age: 30
End: 2025-01-21
Payer: COMMERCIAL

## 2025-01-21 VITALS — DIASTOLIC BLOOD PRESSURE: 60 MMHG | BODY MASS INDEX: 23.89 KG/M2 | WEIGHT: 148 LBS | SYSTOLIC BLOOD PRESSURE: 110 MMHG

## 2025-01-21 DIAGNOSIS — Z34.02 ENCOUNTER FOR PRENATAL CARE IN SECOND TRIMESTER OF FIRST PREGNANCY (HHS-HCC): ICD-10-CM

## 2025-01-21 DIAGNOSIS — Z3A.23 23 WEEKS GESTATION OF PREGNANCY (HHS-HCC): Primary | ICD-10-CM

## 2025-01-21 PROCEDURE — 0501F PRENATAL FLOW SHEET: CPT

## 2025-01-21 NOTE — PROGRESS NOTES
Assessment/Plan   29 y.o.  at 23w6d  - Discussed second trimester labs to be completed between 24-28 weeks gestation, orders placed. She plans to get them done tomorrow since she has the day off  - Reviewed birth plans; pt desires expectant management and NCB  - Routine prenatal care    Follow up in 4 weeks for next prenatal visit. Review results of second trimester labs.     FREDDY Juarez    Subjective     Zayda Dotson is a 29 y.o.  at 23w6d with a working estimated date of delivery of 2025, by Last Menstrual Period presenting for a routine prenatal visit. She is doing well, no concerns. She denies vaginal bleeding, leakage of fluid, contractions, or decreased fetal movement.    Paperwork for breast pump Rx signed today.  Signed up for NCB classes.     Her pregnancy is complicated by:  Pregnancy Problems (from 10/03/24 to present)       Problem Noted Diagnosed Resolved    Rh negative state in antepartum period ([facility]) 10/5/2024 by FREDDY Juarez  No    Priority:  Medium       Overview Signed 10/5/2024  1:45 PM by FREDDY Juarez     [] 28 week Rhogam         Not immune to rubella 10/5/2024 by FREDDY Juarez  No    Priority:  Medium       Overview Signed 10/5/2024  1:45 PM by FREDDY Juarez     Rubella equivocal  Will offer MMR postpartum         23 weeks gestation of pregnancy ([facility]) 10/3/2024 by FREDDY Juarez  No    Priority:  Medium       Overview Addendum 2024  8:10 AM by FREDDY Juarez     Desired provider in labor: [x] CNM  [] Physician  [x] Blood Products: [x] Yes, accepts [] No, needs counseling  [x] Initial BMI: 20.99   [x] Prenatal Labs: wnl with exception of rubella equivocal status  [x] Cervical Cancer Screening up to date: next due 2026  [x] Rh status: Neg  [x] Genetic Screening: cffdna wnl, girl  [x] NT US: (11-13 wks) 0.8mm wnl  [x] Baby ASA (if indicated): not indicated  [x] Pregnancy dated by:  LMP = 12w6d scan    [x] Anatomy US: (19-20 wks): wnl  [] Federal Sterilization consent signed (if indicated):  [] 1hr GCT at 24-28wks:  [] Rhogam (if indicated):   [] Fetal Surveillance (if indicated):  [] Tdap (27-32 wks, may be given up to 36 wks if initial window missed):   [] RSV (32-36 wks) (Sept. to end ):   [x] Flu Vaccine: 10/3    [x] Breastfeeding:  [] Postpartum Birth control method:   [] GBS at 36 - 37 wks:  [] 36 week [facility] consult visit  [] 39 weeks discussion of IOL vs. Expectant management:  [x] Mode of delivery ( anticipated ): Vaginal           Elevated blood pressure reading in office without diagnosis of hypertension 10/3/2024 by FREDDY Juarez  No    Priority:  Medium       Overview Addendum 10/5/2024  1:45 PM by FREDDY Juarez     /83 at NOB visit, 8w1d gestation  Baseline PEC labs wnl         Anxiety disorder affecting pregnancy, antepartum ([facility]) 10/3/2024 by FREDDY Juarez  No    Priority:  Medium       Overview Signed 10/3/2024  8:50 AM by FREDDY Juarez     Pt takes 20mg Lexapro daily  Referred to [facility] Behavioral [facility]                  Objective   Weight: 67.1 kg (148 lb)  TW.165 kg (18 lb)   Expected Total Weight Gain: 11.5 kg (25 lb)-16 kg (35 lb)   Pregravid BMI: 20.99  Pregravid Weight: 59 kg (130 lb)   BP: 110/60  Fetal Heart Rate: 141 Fundal Height (cm): 23 cm

## 2025-01-21 NOTE — PATIENT INSTRUCTIONS
Resources to support a natural childbirth:    Classes:   MICHELLE Baby Hypnobirthing classes  [facility]  The  Experience taught at , registration on  website     Books:  The Mindful Mom To Be (Rosalia Sahu)   Hypnobirthing the marianela method (Dea Acharya)  The Hypnobirthing book (Priti Magallon)   Women of the Wild (CHENG Cochran)  Etelvina May's Guide to Childbirth (Etelvina May)  Gentle Birth, Gentle Mothering (Aurelia Gutierrez)   Better birthed (Temi Cruz)  FOR SUPPORT PERSONS- The Birth Partner (Jenna Guanakito)     Audio:  Osho meditation CD, Chakra Breathing

## 2025-02-02 LAB
GLUCOSE 1H P 50 G GLC PO SERPL-MCNC: 98 MG/DL
T PALLIDUM AB SER QL IA: NORMAL

## 2025-02-04 ENCOUNTER — TELEPHONE (OUTPATIENT)
Dept: OBSTETRICS AND GYNECOLOGY | Facility: CLINIC | Age: 30
End: 2025-02-04
Payer: COMMERCIAL

## 2025-02-04 LAB
GLUCOSE 1H P 50 G GLC PO SERPL-MCNC: 98 MG/DL
T PALLIDUM AB SER QL IA: NEGATIVE

## 2025-02-04 NOTE — TELEPHONE ENCOUNTER
Left message for pt to call office.    RE:  at next ob visit, pt to go to lab to get type and screen andcbc anemia panel w/reflex drawn prior to her appt time.   Pt will then receive her rhogam.  Her ob appt is scheduled for 2/18/25.  Office confirmed with [facility] that this was not drawn at the time of glucose and syphilis tests  was obtained.

## 2025-02-04 NOTE — TELEPHONE ENCOUNTER
Pt returned call to office.   Pt informed of need to get labs drawn.  She will plan to go to lab on 2/18/25 prior to her ob appt.   Pt can then receive rhogam

## 2025-02-04 NOTE — TELEPHONE ENCOUNTER
----- Message from Madeline Spicer sent at 2/4/2025  9:06 AM EST -----  Yes please inform her of that plan - thank you  ----- Message -----  From: Cate Pantoja LPN  Sent: 2/4/2025   8:38 AM EST  To: FREDDY Juarez    Lab did not draw  T&S and CBC.   I see she is RH-    have her come back in to have drawn same day as rhogam?  ----- Message -----  From: Cate Pantoja LPN  Sent: 2/4/2025   8:00 AM EST  To: Cate Pantoja LPN    Call lab today if type and screen is not resulted by now  ----- Message -----  From: FREDDY Juarez  Sent: 2/3/2025   8:42 AM EST  To: [id number] [facility] Clinical Support Staff    Can you please call the lab to see if her CBC and Type and Screen were drawn?

## 2025-02-18 ENCOUNTER — APPOINTMENT (OUTPATIENT)
Dept: OBSTETRICS AND GYNECOLOGY | Facility: CLINIC | Age: 30
End: 2025-02-18
Payer: COMMERCIAL

## 2025-02-18 VITALS — BODY MASS INDEX: 24.37 KG/M2 | SYSTOLIC BLOOD PRESSURE: 120 MMHG | WEIGHT: 151 LBS | DIASTOLIC BLOOD PRESSURE: 70 MMHG

## 2025-02-18 DIAGNOSIS — Z3A.27 27 WEEKS GESTATION OF PREGNANCY (HHS-HCC): ICD-10-CM

## 2025-02-18 DIAGNOSIS — Z34.02 ENCOUNTER FOR PRENATAL CARE IN SECOND TRIMESTER OF FIRST PREGNANCY (HHS-HCC): Primary | ICD-10-CM

## 2025-02-18 PROCEDURE — 86900 BLOOD TYPING SEROLOGIC ABO: CPT

## 2025-02-18 PROCEDURE — 86850 RBC ANTIBODY SCREEN: CPT

## 2025-02-18 PROCEDURE — 90471 IMMUNIZATION ADMIN: CPT

## 2025-02-18 PROCEDURE — 86901 BLOOD TYPING SEROLOGIC RH(D): CPT

## 2025-02-18 PROCEDURE — 85027 COMPLETE CBC AUTOMATED: CPT

## 2025-02-18 PROCEDURE — 0501F PRENATAL FLOW SHEET: CPT

## 2025-02-18 PROCEDURE — 90715 TDAP VACCINE 7 YRS/> IM: CPT

## 2025-02-18 ASSESSMENT — EDINBURGH POSTNATAL DEPRESSION SCALE (EPDS)
I HAVE LOOKED FORWARD WITH ENJOYMENT TO THINGS: AS MUCH AS I EVER DID
I HAVE FELT SCARED OR PANICKY FOR NO GOOD REASON: NO, NOT AT ALL
I HAVE BEEN ANXIOUS OR WORRIED FOR NO GOOD REASON: NO, NOT AT ALL
I HAVE BEEN ABLE TO LAUGH AND SEE THE FUNNY SIDE OF THINGS: AS MUCH AS I ALWAYS COULD
I HAVE FELT SAD OR MISERABLE: NO, NOT AT ALL
THE THOUGHT OF HARMING MYSELF HAS OCCURRED TO ME: NEVER
THINGS HAVE BEEN GETTING ON TOP OF ME: NO, MOST OF THE TIME I HAVE COPED QUITE WELL
I HAVE BLAMED MYSELF UNNECESSARILY WHEN THINGS WENT WRONG: NOT VERY OFTEN
I HAVE BEEN SO UNHAPPY THAT I HAVE BEEN CRYING: NO, NEVER
I HAVE BEEN SO UNHAPPY THAT I HAVE HAD DIFFICULTY SLEEPING: NOT AT ALL
TOTAL SCORE: 2

## 2025-02-18 NOTE — PROGRESS NOTES
Assessment/Plan   29 y.o.  at 27w6d  - Reviewed results of GCT, wnl.   - CBC and Type and Screen were not drawn initially with 2nd trimester labs but were drawn today  - Tdap today  - Routine prenatal care    Follow up in 2 weeks for next prenatal visit. Will give Rhogam at next visit.     FREDDY Juarez    Subjective     Zayda Dotson is a 29 y.o.  at 27w6d with a working estimated date of delivery of 2025, by Last Menstrual Period presenting for a routine prenatal visit. She is doing well, no concerns. She denies vaginal bleeding, leakage of fluid, contractions, or decreased fetal movement.    Would like to get reimbursed for her prenatal classes through her insurance.   Has [place] paperwork today.     Her pregnancy is complicated by:  Pregnancy Problems (from 10/03/24 to present)       Problem Noted Diagnosed Resolved    Rh negative state in antepartum period ([facility]) 10/5/2024 by FREDDY Juarez  No    Priority:  Medium       Overview Signed 10/5/2024  1:45 PM by FREDDY Juarez     [] 28 week Rhogam         Not immune to rubella 10/5/2024 by FREDDY Juarez  No    Priority:  Medium       Overview Signed 10/5/2024  1:45 PM by FREDDY Juarez     Rubella equivocal  Will offer MMR postpartum         27 weeks gestation of pregnancy ([facility]) 10/3/2024 by FREDDY Juarez  No    Priority:  Medium       Overview Addendum 2/3/2025  8:42 AM by FREDDY Juarez     Desired provider in labor: [x] CNM  [] Physician  [x] Blood Products: [x] Yes, accepts [] No, needs counseling  [x] Initial BMI: 20.99   [x] Prenatal Labs: wnl with exception of rubella equivocal status  [x] Cervical Cancer Screening up to date: next due 2026  [x] Rh status: Neg  [x] Genetic Screening: cffdna wnl, girl  [x] NT US: (11-13 wks) 0.8mm wnl  [x] Baby ASA (if indicated): not indicated  [x] Pregnancy dated by: LMP = 12w6d scan    [x] Anatomy US: (19-20 wks): wnl  []  Federal Sterilization consent signed (if indicated):  [x] 1hr GCT at 24-28wks: passed  [] Rhogam (if indicated):   [] Fetal Surveillance (if indicated):  [] Tdap (27-32 wks, may be given up to 36 wks if initial window missed):   [] RSV (32-36 wks) (Sept. to end of ):   [x] Flu Vaccine: 10/3  [x] Birthtracks     [x] Breastfeeding:  [] Postpartum Birth control method:   [] GBS at 36 - 37 wks:  [] 36 week [facility] consult visit  [x] 39 weeks discussion of IOL vs. Expectant management: Expectant until 41 weeks  [x] Mode of delivery ( anticipated ): Vaginal           Elevated blood pressure reading in office without diagnosis of hypertension 10/3/2024 by FREDDY Juarez  No    Priority:  Medium       Overview Addendum 10/5/2024  1:45 PM by FREDDY Juarez     /83 at NOB visit, 8w1d gestation  Baseline PEC labs wnl         Anxiety disorder affecting pregnancy, antepartum ([facility]) 10/3/2024 by FREDDY Juarez  No    Priority:  Medium       Overview Signed 10/3/2024  8:50 AM by FREDDY Juarez     Pt takes 20mg Lexapro daily  Referred to [facility] Behavioral [facility]                  Objective      TW.165 kg (18 lb)   Expected Total Weight Gain: 11.5 kg (25 lb)-16 kg (35 lb)   Pregravid BMI: 20.99  Pregravid Weight: 59 kg (130 lb)

## 2025-02-19 ENCOUNTER — TELEPHONE (OUTPATIENT)
Dept: OBSTETRICS AND GYNECOLOGY | Facility: CLINIC | Age: 30
End: 2025-02-19
Payer: COMMERCIAL

## 2025-02-19 ENCOUNTER — LAB (OUTPATIENT)
Dept: LAB | Facility: HOSPITAL | Age: 30
End: 2025-02-19
Payer: COMMERCIAL

## 2025-02-19 DIAGNOSIS — Z34.02 ENCOUNTER FOR SUPERVISION OF NORMAL FIRST PREGNANCY, SECOND TRIMESTER: Primary | ICD-10-CM

## 2025-02-19 DIAGNOSIS — Z34.02 ENCOUNTER FOR PRENATAL CARE IN SECOND TRIMESTER OF FIRST PREGNANCY (HHS-HCC): ICD-10-CM

## 2025-02-19 LAB
ABO GROUP (TYPE) IN BLOOD: NORMAL
ANTIBODY SCREEN: NORMAL
ERYTHROCYTE [DISTWIDTH] IN BLOOD BY AUTOMATED COUNT: 12.9 % (ref 11.5–14.5)
HCT VFR BLD AUTO: 33.5 % (ref 36–46)
HGB BLD-MCNC: 11.6 G/DL (ref 12–16)
MCH RBC QN AUTO: 30.9 PG (ref 26–34)
MCHC RBC AUTO-ENTMCNC: 34.6 G/DL (ref 32–36)
MCV RBC AUTO: 89 FL (ref 80–100)
NRBC BLD-RTO: 0 /100 WBCS (ref 0–0)
PLATELET # BLD AUTO: 220 X10*3/UL (ref 150–450)
RBC # BLD AUTO: 3.75 X10*6/UL (ref 4–5.2)
REFLEX ADDED, ANEMIA PANEL: NORMAL
RH FACTOR (ANTIGEN D): NORMAL
WBC # BLD AUTO: 12.4 X10*3/UL (ref 4.4–11.3)

## 2025-02-19 NOTE — TELEPHONE ENCOUNTER
----- Message from Madeline Spicer sent at 2/18/2025  3:42 PM EST -----  Regarding: billing for prenatal class  Pt wants to know if there is any way to get reimbursed for her prenatal classes, through her insurance. Any ideas how to do this for her?

## 2025-02-19 NOTE — TELEPHONE ENCOUNTER
Attempted to reach pt by phone to further discuss her request for reimbursement.  Got her voice mail.  Message left to call office.  What classes?  Did she already go to them?

## 2025-03-04 ENCOUNTER — APPOINTMENT (OUTPATIENT)
Dept: OBSTETRICS AND GYNECOLOGY | Facility: CLINIC | Age: 30
End: 2025-03-04
Payer: COMMERCIAL

## 2025-03-04 VITALS — BODY MASS INDEX: 24.69 KG/M2 | WEIGHT: 153 LBS | DIASTOLIC BLOOD PRESSURE: 64 MMHG | SYSTOLIC BLOOD PRESSURE: 116 MMHG

## 2025-03-04 DIAGNOSIS — Z3A.29 29 WEEKS GESTATION OF PREGNANCY (HHS-HCC): ICD-10-CM

## 2025-03-04 DIAGNOSIS — Z34.03 ENCOUNTER FOR PRENATAL CARE IN THIRD TRIMESTER OF FIRST PREGNANCY (HHS-HCC): Primary | ICD-10-CM

## 2025-03-04 DIAGNOSIS — O26.899 RH NEGATIVE STATE IN ANTEPARTUM PERIOD (HHS-HCC): ICD-10-CM

## 2025-03-04 DIAGNOSIS — Z67.91 RH NEGATIVE STATE IN ANTEPARTUM PERIOD (HHS-HCC): ICD-10-CM

## 2025-03-04 PROCEDURE — 0501F PRENATAL FLOW SHEET: CPT

## 2025-03-04 PROCEDURE — 96372 THER/PROPH/DIAG INJ SC/IM: CPT

## 2025-03-04 NOTE — PROGRESS NOTES
Rhogam given today   Pt states she is having some discomfort in the upper abdomen     Assessment/Plan   29 y.o.  at 29w6d  - Rhogam today  - Discussed kick counts  - Discussed plans for PPBC; Pt elects cycle tracking / condoms  - Advised pt to change positions slowly, stay well hydrated, and have frequent snacks throughout the day to prevent syncopal episodes. Encouraged pt to call office or seek emergency care if symptoms don't resolve with rest / hydration, or if accompanied by CP/SOB.  - Routine prenatal care    Follow up in 2 weeks for next prenatal visit    FREDDY Juarez    Subjective     Zayda Dotson is a 29 y.o.  at 29w6d with a working estimated date of delivery of 2025, by Last Menstrual Period presenting for a routine prenatal visit. She is doing well, no concerns. She denies vaginal bleeding, leakage of fluid, or regular contractions. She endorses good FM.    Over the weekend she [place] beer [place] and became dizzy, overheated, and had ringing in her ears. Felt as if she was about to pass out. She sat down and her symptoms resolved.     Also having more pain in lower ribcage.     Her pregnancy is complicated by:  Pregnancy Problems (from 10/03/24 to present)       Problem Noted Diagnosed Resolved    Rh negative state in antepartum period ([facility]) 10/5/2024 by FREDDY Juarez  No    Priority:  Medium       Overview Signed 10/5/2024  1:45 PM by FREDDY Juarez     [] 28 week Rhogam         Not immune to rubella 10/5/2024 by FREDDY Juarez  No    Priority:  Medium       Overview Signed 10/5/2024  1:45 PM by FREDDY Juarez     Rubella equivocal  Will offer MMR postpartum         29 weeks gestation of pregnancy ([facility]) 10/3/2024 by FREDDY Juarez  No    Priority:  Medium       Overview Addendum 2025  4:04 PM by FREDDY Juarez     Desired provider in labor: [x] CNM  [] Physician  [x] Blood  Products: [x] Yes, accepts [] No, needs counseling  [x] Initial BMI: 20.99   [x] Prenatal Labs: wnl with exception of rubella equivocal status  [x] Cervical Cancer Screening up to date: next due 6/2026  [x] Rh status: Neg  [x] Genetic Screening: cffdna wnl, girl  [x] NT US: (11-13 wks) 0.8mm wnl  [x] Baby ASA (if indicated): not indicated  [x] Pregnancy dated by: LMP = 12w6d scan    [x] Anatomy US: (19-20 wks): wnl  [] Federal Sterilization consent signed (if indicated):  [x] 1hr GCT at 24-28wks: passed  [] Rhogam (if indicated):   [] Fetal Surveillance (if indicated):  [x] Tdap (27-32 wks, may be given up to 36 wks if initial window missed): 2/18  [] RSV (32-36 wks) (Sept. to end of Jan):   [x] Flu Vaccine: 10/3  [x] Birthtracks 1/21    [x] Breastfeeding:  [] Postpartum Birth control method:   [] GBS at 36 - 37 wks:  [] 36 week [facility] consult visit  [x] 39 weeks discussion of IOL vs. Expectant management: Expectant until 41 weeks  [x] Mode of delivery ( anticipated ): Vaginal           Elevated blood pressure reading in office without diagnosis of hypertension 10/3/2024 by FREDDY Juarez  No    Priority:  Medium       Overview Addendum 10/5/2024  1:45 PM by FREDDY Juarez     /83 at NOB visit, 8w1d gestation  Baseline PEC labs wnl         Anxiety disorder affecting pregnancy, antepartum ([facility]) 10/3/2024 by FREDDY Juarez  No    Priority:  Medium       Overview Signed 10/3/2024  8:50 AM by FREDDY Juarez     Pt takes 20mg Lexapro daily  Referred to [facility] Behavioral [facility]                  Objective   Weight: 69.4 kg (153 lb)  TWG: 10.4 kg (23 lb)   Expected Total Weight Gain: 11.5 kg (25 lb)-16 kg (35 lb)   Pregravid BMI: 20.99  Pregravid Weight: 59 kg (130 lb)   BP: 116/64  Fetal Heart Rate: 135 Fundal Height (cm): 30 cm

## 2025-03-15 ENCOUNTER — PATIENT MESSAGE (OUTPATIENT)
Dept: OBSTETRICS AND GYNECOLOGY | Facility: CLINIC | Age: 30
End: 2025-03-15
Payer: COMMERCIAL

## 2025-03-18 ENCOUNTER — APPOINTMENT (OUTPATIENT)
Dept: OBSTETRICS AND GYNECOLOGY | Facility: CLINIC | Age: 30
End: 2025-03-18
Payer: COMMERCIAL

## 2025-03-18 VITALS — BODY MASS INDEX: 24.86 KG/M2 | WEIGHT: 154 LBS | DIASTOLIC BLOOD PRESSURE: 60 MMHG | SYSTOLIC BLOOD PRESSURE: 116 MMHG

## 2025-03-18 DIAGNOSIS — Z34.03 ENCOUNTER FOR PRENATAL CARE IN THIRD TRIMESTER OF FIRST PREGNANCY (HHS-HCC): ICD-10-CM

## 2025-03-18 DIAGNOSIS — Z3A.31 31 WEEKS GESTATION OF PREGNANCY (HHS-HCC): Primary | ICD-10-CM

## 2025-03-18 PROCEDURE — 0501F PRENATAL FLOW SHEET: CPT

## 2025-03-18 NOTE — PROGRESS NOTES
Assessment/Plan   29 y.o.  at 31w6d  - Reviewed signs and symptoms of  labor including more than 6 painful contractions in a 1 hour period, which may be felt in lower abdomen or lower back. Reviewed warning signs including vaginal bleeding, leaking of fluid, and decreased fetal movement. Pt has emergency answering service number and aware of when to call.   - Provided with copy of Grenora Moreno Book  - Routine prenatal care    Follow up in 2 weeks for next prenatal visit. How was [facility] tour?    FREDDY Juarez    Subjective     Zayda Dotson is a 29 y.o.  at 31w6d with a working estimated date of delivery of 2025, by Last Menstrual Period presenting for a routine prenatal visit. She is doing well, no concerns. She denies vaginal bleeding, leakage of fluid, or regular contractions. She endorses good FM.    Leaning towards [facility] for delivery, has tour scheduled for 3/30.   Has taken LOTS of birth/ classes and still has a Lamaze class coming up.     Her pregnancy is complicated by:  Pregnancy Problems (from 10/03/24 to present)       Problem Noted Diagnosed Resolved    Rh negative state in antepartum period ([facility]) 10/5/2024 by FREDDY Juarez  No    Priority:  Medium       Overview Signed 10/5/2024  1:45 PM by FREDDY Juarez     [x] 28 week Rhogam         Not immune to rubella 10/5/2024 by FREDDY Juarez  No    Priority:  Medium       Overview Signed 10/5/2024  1:45 PM by FREDDY Juarez     Rubella equivocal  Will offer MMR postpartum         31 weeks gestation of pregnancy ([facility]) 10/3/2024 by FREDDY Juarez  No    Priority:  Medium       Overview Addendum 3/18/2025  3:06 PM by FREDDY Juarez     Desired provider in labor: [x] CNM  [] Physician  [x] Blood Products: [x] Yes, accepts [] No, needs counseling  [x] Initial BMI: 20.99   [x] Prenatal Labs: wnl with exception of rubella equivocal status  [x] Cervical Cancer  Screening up to date: next due 6/2026  [x] Rh status: Neg  [x] Genetic Screening: cffdna wnl, girl  [x] NT US: (11-13 wks) 0.8mm wnl  [x] Baby ASA (if indicated): not indicated  [x] Pregnancy dated by: LMP = 12w6d scan    [x] Anatomy US: (19-20 wks): wnl  [x] 1hr GCT at 24-28wks: passed  [x] Rhogam (if indicated): 3/4  [] Fetal Surveillance (if indicated):  [x] Tdap (27-32 wks, may be given up to 36 wks if initial window missed): 2/18  [x] Flu Vaccine: 10/3  [x] Birthtracks 1/21    [x] Breastfeeding:  [x] Postpartum Birth control method: cycle tracking / condoms  [] GBS at 36 - 37 wks:  [] 36 week [facility] consult visit  [x] 39 weeks discussion of IOL vs. Expectant management: Expectant until 41 weeks  [x] Mode of delivery ( anticipated ): Vaginal           Elevated blood pressure reading in office without diagnosis of hypertension 10/3/2024 by FREDDY Juarez  No    Priority:  Medium       Overview Addendum 10/5/2024  1:45 PM by FREDDY Juarez     /83 at NOB visit, 8w1d gestation  Baseline PEC labs wnl         Anxiety disorder affecting pregnancy, antepartum ([facility]) 10/3/2024 by FREDDY Juarez  No    Priority:  Medium       Overview Signed 10/3/2024  8:50 AM by FREDDY Juarez     Pt takes 20mg Lexapro daily  Referred to [facility]                  Objective   Weight: 69.9 kg (154 lb)  TWG: 10.9 kg (24 lb)   Expected Total Weight Gain: 11.5 kg (25 lb)-16 kg (35 lb)   Pregravid BMI: 20.99  Pregravid Weight: 59 kg (130 lb)   BP: 116/60  Fetal Heart Rate: 130 Fundal Height (cm): 31 cm

## 2025-04-01 ENCOUNTER — APPOINTMENT (OUTPATIENT)
Dept: OBSTETRICS AND GYNECOLOGY | Facility: CLINIC | Age: 30
End: 2025-04-01
Payer: COMMERCIAL

## 2025-04-01 VITALS — WEIGHT: 154 LBS | SYSTOLIC BLOOD PRESSURE: 112 MMHG | DIASTOLIC BLOOD PRESSURE: 58 MMHG | BODY MASS INDEX: 24.86 KG/M2

## 2025-04-01 DIAGNOSIS — Z3A.33 33 WEEKS GESTATION OF PREGNANCY (HHS-HCC): Primary | ICD-10-CM

## 2025-04-01 DIAGNOSIS — Z34.03 PRENATAL CARE, FIRST PREGNANCY IN THIRD TRIMESTER: ICD-10-CM

## 2025-04-01 PROCEDURE — 0501F PRENATAL FLOW SHEET: CPT

## 2025-04-01 NOTE — PROGRESS NOTES
Assessment/Plan   29 y.o.  at 33w6d  - Reviewed signs and symptoms of  labor including more than 6 painful contractions in a 1 hour period, which may be felt in lower abdomen or lower back. Reviewed warning signs including vaginal bleeding, leaking of fluid, and decreased fetal movement. Pt has emergency answering service number and aware of when to call.   - Routine prenatal care    Follow up in 2 weeks for next prenatal visit    FREDDY Juarez    Subjective     Zayda Dotson is a 29 y.o.  at 33w6d with a working estimated date of delivery of 2025, by Last Menstrual Period presenting for a routine prenatal visit. She is doing well, no concerns. She denies vaginal bleeding, leakage of fluid, or regular contractions. She endorses good FM.    Moved tour of L&D to two weeks out    Her pregnancy is complicated by:  Pregnancy Problems (from 10/03/24 to present)       Problem Noted Diagnosed Resolved    Rh negative state in antepartum period ([facility]) 10/5/2024 by FREDDY Juarez  No    Priority:  Medium       Overview Addendum 3/18/2025  3:07 PM by FREDDY Juarez     [x] 28 week Rhogam         Not immune to rubella 10/5/2024 by FREDDY Juarez  No    Priority:  Medium       Overview Signed 10/5/2024  1:45 PM by FREDDY Juarez     Rubella equivocal  Will offer MMR postpartum         33 weeks gestation of pregnancy ([facility]) 10/3/2024 by FREDDY Juarez  No    Priority:  Medium       Overview Addendum 2025  2:08 PM by FREDDY Juarez     [place] [facility]    Desired provider in labor: [x] CNM  [] Physician  [x] Blood Products: [x] Yes, accepts [] No, needs counseling  [x] Initial BMI: 20.99   [x] Prenatal Labs: wnl with exception of rubella equivocal status  [x] Cervical Cancer Screening up to date: next due 2026  [x] Rh status: Neg  [x] Genetic Screening: cffdna wnl, girl  [x] NT US: (11-13 wks) 0.8mm wnl  [x] Baby ASA  (if indicated): not indicated  [x] Pregnancy dated by: LMP = 12w6d scan    [x] Anatomy US: (19-20 wks): wnl  [x] 1hr GCT at 24-28wks: passed  [x] Rhogam (if indicated): 3/4  [x] Tdap (27-32 wks, may be given up to 36 wks if initial window missed): 2/18  [x] Flu Vaccine: 10/3  [x] Birthtracks 1/21    [x] Breastfeeding:  [x] Postpartum Birth control method: cycle tracking / condoms  [] GBS at 36 - 37 wks:  [x] 39 weeks discussion of IOL vs. Expectant management: Expectant until 41 weeks  [x] Mode of delivery ( anticipated ): Vaginal           Elevated blood pressure reading in office without diagnosis of hypertension 10/3/2024 by FREDDY Juarez  No    Priority:  Medium       Overview Addendum 10/5/2024  1:45 PM by FREDDY Juarez     /83 at NOB visit, 8w1d gestation  Baseline PEC labs wnl         Anxiety disorder affecting pregnancy, antepartum 10/3/2024 by FREDDY Juarez  No    Priority:  Medium       Overview Signed 10/3/2024  8:50 AM by FREDDY Juarez     Pt takes 20mg Lexapro daily  Referred to [facility]                  Objective   Weight: 69.9 kg (154 lb)  TWG: 10.9 kg (24 lb)   Expected Total Weight Gain: 11.5 kg (25 lb)-16 kg (35 lb)   Pregravid BMI: 20.99  Pregravid Weight: 59 kg (130 lb)   BP: 112/58  Fetal Heart Rate: 138 Fundal Height (cm): 33 cm

## 2025-04-03 ENCOUNTER — TELEPHONE (OUTPATIENT)
Dept: OBSTETRICS AND GYNECOLOGY | Facility: CLINIC | Age: 30
End: 2025-04-03
Payer: COMMERCIAL

## 2025-04-15 ENCOUNTER — APPOINTMENT (OUTPATIENT)
Dept: OBSTETRICS AND GYNECOLOGY | Facility: CLINIC | Age: 30
End: 2025-04-15
Payer: COMMERCIAL

## 2025-04-15 VITALS — SYSTOLIC BLOOD PRESSURE: 116 MMHG | BODY MASS INDEX: 25.18 KG/M2 | WEIGHT: 156 LBS | DIASTOLIC BLOOD PRESSURE: 68 MMHG

## 2025-04-15 DIAGNOSIS — Z3A.35 35 WEEKS GESTATION OF PREGNANCY (HHS-HCC): Primary | ICD-10-CM

## 2025-04-15 DIAGNOSIS — Z34.03 PRENATAL CARE, FIRST PREGNANCY IN THIRD TRIMESTER: ICD-10-CM

## 2025-04-15 PROCEDURE — 0501F PRENATAL FLOW SHEET: CPT

## 2025-04-15 NOTE — PROGRESS NOTES
Assessment/Plan   29 y.o.  at 35w6d  - Discussed GBS screening to be collected at next visit  - Reviewed birth plan and scanned into media tab of EMR  - Routine prenatal care    Follow up in 1 week for next prenatal visit. Collect GBS.    FREDDY Juarez    Subjective     Zayda Dotson is a 29 y.o.  at 35w6d with a working estimated date of delivery of 2025, by Last Menstrual Period presenting for a routine prenatal visit. She is doing well, no concerns. She denies vaginal bleeding, leakage of fluid, or regular contractions. She endorses good .    [facility] and developed a birth plan. Feeling more fatigued.     Her pregnancy is complicated by:  Pregnancy Problems (from 10/03/24 to present)       Problem Noted Diagnosed Resolved    Rh negative state in antepartum period ([facility]) 10/5/2024 by FREDDY Juarez  No    Priority:  Medium       Overview Addendum 3/18/2025  3:07 PM by FREDDY Juarez     [x] 28 week Rhogam         Not immune to rubella 10/5/2024 by FREDDY Juarez  No    Priority:  Medium       Overview Signed 10/5/2024  1:45 PM by FREDDY Juarez     Rubella equivocal  Will offer MMR postpartum         35 weeks gestation of pregnancy ([facility]) 10/3/2024 by FREDDY Juarez  No    Priority:  Medium       Overview Addendum 2025  2:08 PM by FREDDY Juarez     Del[facility] location: [facility]    Desired provider in labor: [x] CNM  [] Physician  [x] Blood Products: [x] Yes, accepts [] No, needs counseling  [x] Initial BMI: 20.99   [x] Prenatal Labs: wnl with exception of rubella equivocal status  [x] Cervical Cancer Screening up to date: next due 2026  [x] Rh status: Neg  [x] Genetic Screening: cffdna wnl, girl  [x] NT US: (11-13 wks) 0.8mm wnl  [x] Baby ASA (if indicated): not indicated  [x] Pregnancy dated by: LMP = 12w6d scan    [x] Anatomy US: (19-20 wks): wnl  [x] 1hr GCT at 24-28wks: passed  [x] Rhogam (if indicated):  3/4  [x] Tdap (27-32 wks, may be given up to 36 wks if initial window missed):   [x] Flu Vaccine: 10/3  [x] Birthtracks     [x] Breastfeeding:  [x] Postpartum Birth control method: cycle tracking / condoms  [] GBS at 36 - 37 wks:  [x] 39 weeks discussion of IOL vs. Expectant management: Expectant until 41 weeks  [x] Mode of delivery ( anticipated ): Vaginal           Elevated blood pressure reading in office without diagnosis of hypertension 10/3/2024 by FREDDY Juarez  No    Priority:  Medium       Overview Addendum 10/5/2024  1:45 PM by FREDDY Juarez     /83 at NOB visit, 8w1d gestation  Baseline PEC labs wnl         Anxiety disorder affecting pregnancy, antepartum 10/3/2024 by FREDDY Juarez  No    Priority:  Medium       Overview Signed 10/3/2024  8:50 AM by FREDDY Juarez     Pt takes 20mg Lexapro daily  Referred to [facility]                  Objective   Weight: 70.8 kg (156 lb)  TW.8 kg (26 lb)   Expected Total Weight Gain: 11.5 kg (25 lb)-16 kg (35 lb)   Pregravid BMI: 20.99  Pregravid Weight: 59 kg (130 lb)   BP: 116/68  Fetal Heart Rate: 142 Fundal Height (cm): 35 cm Presentation: Vertex

## 2025-04-22 ENCOUNTER — APPOINTMENT (OUTPATIENT)
Dept: OBSTETRICS AND GYNECOLOGY | Facility: CLINIC | Age: 30
End: 2025-04-22
Payer: COMMERCIAL

## 2025-04-22 VITALS — BODY MASS INDEX: 25.5 KG/M2 | SYSTOLIC BLOOD PRESSURE: 108 MMHG | DIASTOLIC BLOOD PRESSURE: 60 MMHG | WEIGHT: 158 LBS

## 2025-04-22 DIAGNOSIS — Z34.03 PRENATAL CARE, FIRST PREGNANCY IN THIRD TRIMESTER: Primary | ICD-10-CM

## 2025-04-22 DIAGNOSIS — Z3A.36 36 WEEKS GESTATION OF PREGNANCY (HHS-HCC): ICD-10-CM

## 2025-04-22 PROCEDURE — 0501F PRENATAL FLOW SHEET: CPT

## 2025-04-22 NOTE — PROGRESS NOTES
Assessment/Plan   29 y.o.  at 36w6d  - GBS collected today  - Reviewed signs and symptoms of labor including regular, painful contractions every 5 minutes or less for 2 hours, gush of fluid, or leaking of fluid. Reviewed warning signs including decreased fetal movement or vaginal bleeding. Pt has emergency answering service number and aware of when to call. Reviewed with pt that when presenting for labor, she will enter through the emergency department before proceeding to 2nd floor labor and delivery.   - Routine prenatal care    Follow up in 1 week for next prenatal visit    FREDDY Juarez    Subjective     Zayda Dotson is a 29 y.o.  at 36w6d with a working estimated date of delivery of 2025, by Last Menstrual Period presenting for a routine prenatal visit. She is doing well, no concerns. She denies vaginal bleeding, leakage of fluid, or regular contractions. She endorses good FM.    Her pregnancy is complicated by:  Pregnancy Problems (from 10/03/24 to present)       Problem Noted Diagnosed Resolved    Rh negative state in antepartum period ([facility]) 10/5/2024 by FREDDY Juarez  No    Priority:  Medium       Overview Addendum 3/18/2025  3:07 PM by FREDDY Juarez   [x] 28 week Rhogam         Not immune to rubella 10/5/2024 by FREDDY Juarez  No    Priority:  Medium       Overview Signed 10/5/2024  1:45 PM by FREDDY Juarez   Rubella equivocal  Will offer MMR postpartum         36 weeks gestation of pregnancy ([facility]) 10/3/2024 by FREDDY Juarez  No    Priority:  Medium       Overview Addendum 2025  2:08 PM by FREDDY Juarez   [place] [facility] [place]ired provider in labor: [x] CNM  [] Physician  [x] Blood Products: [x] Yes, accepts [] No, needs counseling  [x] Initial BMI: 20.99   [x] Prenatal Labs: wnl with exception of rubella equivocal status  [x] Cervical Cancer Screening up to date: next due 2026  [x] Rh  status: Neg  [x] Genetic Screening: cffdna wnl, girl  [x] NT US: (11-13 wks) 0.8mm wnl  [x] Baby ASA (if indicated): not indicated  [x] Pregnancy dated by: LMP = 12w6d scan    [x] Anatomy US: (19-20 wks): wnl  [x] 1hr GCT at 24-28wks: passed  [x] Rhogam (if indicated): 3/4  [x] Tdap (27-32 wks, may be given up to 36 wks if initial window missed):   [x] Flu Vaccine: 10/3  [x] Birthtracks     [x] Breastfeeding:  [x] Postpartum Birth control method: cycle tracking / condoms  [] GBS at 36 - 37 wks:  [x] 39 weeks discussion of IOL vs. Expectant management: Expectant until 41 weeks  [x] Mode of delivery ( anticipated ): Vaginal           Elevated blood pressure reading in office without diagnosis of hypertension 10/3/2024 by FREDDY Juarez  No    Priority:  Medium       Overview Addendum 10/5/2024  1:45 PM by FREDDY Juarez   /83 at NOB visit, 8w1d gestation  Baseline PEC labs wnl         Anxiety disorder affecting pregnancy, antepartum 10/3/2024 by FREDDY Juarez  No    Priority:  Medium       Overview Signed 10/3/2024  8:50 AM by FREDDY Juarez   Pt takes 20mg Lexapro daily  Referred to [facility]                  Objective   Weight: 71.7 kg (158 lb)  TW.7 kg (28 lb)   Expected Total Weight Gain: 11.5 kg (25 lb)-16 kg (35 lb)   Pregravid BMI: 20.99  Pregravid Weight: 59 kg (130 lb)   BP: 108/60  Fetal Heart Rate: 145 Fundal Height (cm): 35 cm Presentation: Vertex

## 2025-04-25 LAB — GP B STREP SPEC QL CULT: NORMAL

## 2025-04-29 ENCOUNTER — APPOINTMENT (OUTPATIENT)
Dept: OBSTETRICS AND GYNECOLOGY | Facility: CLINIC | Age: 30
End: 2025-04-29
Payer: COMMERCIAL

## 2025-04-29 VITALS — BODY MASS INDEX: 25.82 KG/M2 | SYSTOLIC BLOOD PRESSURE: 116 MMHG | DIASTOLIC BLOOD PRESSURE: 70 MMHG | WEIGHT: 160 LBS

## 2025-04-29 DIAGNOSIS — Z34.03 PRENATAL CARE, FIRST PREGNANCY IN THIRD TRIMESTER: ICD-10-CM

## 2025-04-29 DIAGNOSIS — Z3A.37 37 WEEKS GESTATION OF PREGNANCY (HHS-HCC): Primary | ICD-10-CM

## 2025-04-29 PROCEDURE — 0501F PRENATAL FLOW SHEET: CPT

## 2025-04-29 NOTE — PROGRESS NOTES
Assessment/Plan   29 y.o.  at 37w6d  - Reviewed GBS negative result  - Routine prenatal care    Follow up in 1 week for next prenatal visit    FREDDY Juarez    Subjective     Zayda Dotson is a 29 y.o.  at 37w6d with a working estimated date of delivery of 2025, by Last Menstrual Period presenting for a routine prenatal visit. She is doing well, no concerns. She denies vaginal bleeding, leakage of fluid, or regular contractions. She endorses good FM.    Occasional cramping    Her pregnancy is complicated by:  Pregnancy Problems (from 10/03/24 to present)       Problem Noted Diagnosed Resolved    Rh negative state in antepartum period ([facility]) 10/5/2024 by FREDDY Juarez  No    Priority:  Medium       Overview Addendum 3/18/2025  3:07 PM by FREDDY Juarez   [x] 28 week Rhogam         Not immune to rubella 10/5/2024 by FREDDY Juarez  No    Priority:  Medium       Overview Signed 10/5/2024  1:45 PM by FREDDY Juarez   Rubella equivocal  Will offer MMR postpartum         37 weeks gestation of pregnancy ([facility]) 10/3/2024 by FREDDY Juarez  No    Priority:  Medium       Overview Addendum 2025  9:15 AM by FREDDY Juarez   [place] [facility] [place]    Desired provider in labor: [x] CNM  [] Physician  [x] Blood Products: [x] Yes, accepts [] No, needs counseling  [x] Initial BMI: 20.99   [x] Prenatal Labs: wnl with exception of rubella equivocal status  [x] Cervical Cancer Screening up to date: next due 2026  [x] Rh status: Neg  [x] Genetic Screening: cffdna wnl, girl  [x] NT US: (11-13 wks) 0.8mm wnl  [x] Baby ASA (if indicated): not indicated  [x] Pregnancy dated by: LMP = 12w6d scan    [x] Anatomy US: (19-20 wks): wnl  [x] 1hr GCT at 24-28wks: passed  [x] Rhogam (if indicated): 3/4  [x] Tdap (27-32 wks, may be given up to 36 wks if initial window missed):   [x] Flu Vaccine: 10/3  [x] Birthtracks     [x]  Breastfeeding:  [x] Postpartum Birth control method: cycle tracking / condoms  [x] GBS at 36 - 37 wks: neg  [x] 39 weeks discussion of IOL vs. Expectant management: Expectant until 41 weeks  [x] Mode of delivery ( anticipated ): Vaginal, desires NCB           Elevated blood pressure reading in office without diagnosis of hypertension 10/3/2024 by FREDDY Juarez  No    Priority:  Medium       Overview Addendum 10/5/2024  1:45 PM by FREDDY Juarez   /83 at NOB visit, 8w1d gestation  Baseline PEC labs wnl         Anxiety disorder affecting pregnancy, antepartum 10/3/2024 by FREDDY Juarez  No    Priority:  Medium       Overview Signed 10/3/2024  8:50 AM by FREDDY Juarez   Pt takes 20mg Lexapro daily  [facility]erred to [facility]                  Objective   Weight: 72.6 kg (160 lb)  TW.6 kg (30 lb)   Expected Total Weight Gain: 11.5 kg (25 lb)-16 kg (35 lb)   Pregravid BMI: 20.99  Pregravid Weight: 59 kg (130 lb)   BP: 116/70  Fetal Heart Rate: 128 Fundal Height (cm): 36 cm Presentation: Vertex

## 2025-05-05 ENCOUNTER — ANESTHESIA EVENT (OUTPATIENT)
Dept: OBSTETRICS AND GYNECOLOGY | Facility: HOSPITAL | Age: 30
End: 2025-05-05
Payer: COMMERCIAL

## 2025-05-05 ENCOUNTER — TELEPHONE (OUTPATIENT)
Dept: OBSTETRICS AND GYNECOLOGY | Facility: HOSPITAL | Age: 30
End: 2025-05-05
Payer: COMMERCIAL

## 2025-05-05 ENCOUNTER — HOSPITAL ENCOUNTER (INPATIENT)
Facility: HOSPITAL | Age: 30
LOS: 4 days | Discharge: HOME | End: 2025-05-09
Attending: STUDENT IN AN ORGANIZED HEALTH CARE EDUCATION/TRAINING PROGRAM
Payer: COMMERCIAL

## 2025-05-05 ENCOUNTER — ANESTHESIA (OUTPATIENT)
Dept: OBSTETRICS AND GYNECOLOGY | Facility: HOSPITAL | Age: 30
End: 2025-05-05
Payer: COMMERCIAL

## 2025-05-05 PROBLEM — Z37.9 NORMAL LABOR (HHS-HCC): Status: ACTIVE | Noted: 2025-05-05

## 2025-05-05 LAB
ABO GROUP (TYPE) IN BLOOD: NORMAL
ANTIBODY SCREEN: NORMAL
ERYTHROCYTE [DISTWIDTH] IN BLOOD BY AUTOMATED COUNT: 13.1 % (ref 11.5–14.5)
HCT VFR BLD AUTO: 42.2 % (ref 36–46)
HGB BLD-MCNC: 14.8 G/DL (ref 12–16)
MCH RBC QN AUTO: 30.5 PG (ref 26–34)
MCHC RBC AUTO-ENTMCNC: 35.1 G/DL (ref 32–36)
MCV RBC AUTO: 87 FL (ref 80–100)
NRBC BLD-RTO: 0 /100 WBCS (ref 0–0)
PLATELET # BLD AUTO: 187 X10*3/UL (ref 150–450)
RBC # BLD AUTO: 4.86 X10*6/UL (ref 4–5.2)
RH FACTOR (ANTIGEN D): NORMAL
TREPONEMA PALLIDUM IGG+IGM AB [PRESENCE] IN SERUM OR PLASMA BY IMMUNOASSAY: NONREACTIVE
WBC # BLD AUTO: 14.3 X10*3/UL (ref 4.4–11.3)

## 2025-05-05 PROCEDURE — 7210000002 HC LABOR PER HOUR

## 2025-05-05 PROCEDURE — 86780 TREPONEMA PALLIDUM: CPT

## 2025-05-05 PROCEDURE — 3700000014 EPIDURAL BLOCK: Mod: GC | Performed by: STUDENT IN AN ORGANIZED HEALTH CARE EDUCATION/TRAINING PROGRAM

## 2025-05-05 PROCEDURE — 36415 COLL VENOUS BLD VENIPUNCTURE: CPT

## 2025-05-05 PROCEDURE — 2500000004 HC RX 250 GENERAL PHARMACY W/ HCPCS (ALT 636 FOR OP/ED): Mod: JW | Performed by: STUDENT IN AN ORGANIZED HEALTH CARE EDUCATION/TRAINING PROGRAM

## 2025-05-05 PROCEDURE — 2500000004 HC RX 250 GENERAL PHARMACY W/ HCPCS (ALT 636 FOR OP/ED): Mod: JZ | Performed by: STUDENT IN AN ORGANIZED HEALTH CARE EDUCATION/TRAINING PROGRAM

## 2025-05-05 PROCEDURE — 86901 BLOOD TYPING SEROLOGIC RH(D): CPT

## 2025-05-05 PROCEDURE — 85027 COMPLETE CBC AUTOMATED: CPT

## 2025-05-05 PROCEDURE — 2500000004 HC RX 250 GENERAL PHARMACY W/ HCPCS (ALT 636 FOR OP/ED): Mod: JZ

## 2025-05-05 PROCEDURE — 86870 RBC ANTIBODY IDENTIFICATION: CPT

## 2025-05-05 PROCEDURE — 1120000001 HC OB PRIVATE ROOM DAILY

## 2025-05-05 RX ORDER — TRANEXAMIC ACID 100 MG/ML
1000 INJECTION, SOLUTION INTRAVENOUS ONCE AS NEEDED
Status: DISCONTINUED | OUTPATIENT
Start: 2025-05-05 | End: 2025-05-06 | Stop reason: HOSPADM

## 2025-05-05 RX ORDER — CARBOPROST TROMETHAMINE 250 UG/ML
250 INJECTION, SOLUTION INTRAMUSCULAR ONCE AS NEEDED
Status: DISCONTINUED | OUTPATIENT
Start: 2025-05-05 | End: 2025-05-06 | Stop reason: HOSPADM

## 2025-05-05 RX ORDER — ESCITALOPRAM OXALATE 20 MG/1
20 TABLET ORAL DAILY
Status: DISCONTINUED | OUTPATIENT
Start: 2025-05-05 | End: 2025-05-06

## 2025-05-05 RX ORDER — LABETALOL HYDROCHLORIDE 5 MG/ML
20 INJECTION, SOLUTION INTRAVENOUS ONCE AS NEEDED
Status: DISCONTINUED | OUTPATIENT
Start: 2025-05-05 | End: 2025-05-06 | Stop reason: HOSPADM

## 2025-05-05 RX ORDER — OXYTOCIN 10 [USP'U]/ML
10 INJECTION, SOLUTION INTRAMUSCULAR; INTRAVENOUS ONCE AS NEEDED
Status: DISCONTINUED | OUTPATIENT
Start: 2025-05-05 | End: 2025-05-06 | Stop reason: HOSPADM

## 2025-05-05 RX ORDER — LIDOCAINE HYDROCHLORIDE AND EPINEPHRINE 15; 5 MG/ML; UG/ML
INJECTION, SOLUTION EPIDURAL AS NEEDED
Status: DISCONTINUED | OUTPATIENT
Start: 2025-05-05 | End: 2025-05-06

## 2025-05-05 RX ORDER — OXYTOCIN/0.9 % SODIUM CHLORIDE 30/500 ML
60 PLASTIC BAG, INJECTION (ML) INTRAVENOUS ONCE AS NEEDED
Status: COMPLETED | OUTPATIENT
Start: 2025-05-05 | End: 2025-05-06

## 2025-05-05 RX ORDER — FENTANYL/ROPIVACAINE/NS/PF 2MCG/ML-.2
0-25 PLASTIC BAG, INJECTION (ML) INJECTION CONTINUOUS
Status: DISCONTINUED | OUTPATIENT
Start: 2025-05-05 | End: 2025-05-06

## 2025-05-05 RX ORDER — TERBUTALINE SULFATE 1 MG/ML
0.25 INJECTION SUBCUTANEOUS ONCE AS NEEDED
Status: DISCONTINUED | OUTPATIENT
Start: 2025-05-05 | End: 2025-05-06 | Stop reason: HOSPADM

## 2025-05-05 RX ORDER — HYDRALAZINE HYDROCHLORIDE 20 MG/ML
5 INJECTION INTRAMUSCULAR; INTRAVENOUS ONCE AS NEEDED
Status: DISCONTINUED | OUTPATIENT
Start: 2025-05-05 | End: 2025-05-06 | Stop reason: HOSPADM

## 2025-05-05 RX ORDER — MISOPROSTOL 200 UG/1
800 TABLET ORAL ONCE AS NEEDED
Status: DISCONTINUED | OUTPATIENT
Start: 2025-05-05 | End: 2025-05-06 | Stop reason: HOSPADM

## 2025-05-05 RX ORDER — SODIUM CHLORIDE, SODIUM LACTATE, POTASSIUM CHLORIDE, CALCIUM CHLORIDE 600; 310; 30; 20 MG/100ML; MG/100ML; MG/100ML; MG/100ML
75 INJECTION, SOLUTION INTRAVENOUS CONTINUOUS
Status: DISCONTINUED | OUTPATIENT
Start: 2025-05-05 | End: 2025-05-06

## 2025-05-05 RX ORDER — OXYTOCIN/0.9 % SODIUM CHLORIDE 30/500 ML
60 PLASTIC BAG, INJECTION (ML) INTRAVENOUS ONCE AS NEEDED
Status: DISCONTINUED | OUTPATIENT
Start: 2025-05-05 | End: 2025-05-06 | Stop reason: HOSPADM

## 2025-05-05 RX ORDER — ONDANSETRON HYDROCHLORIDE 2 MG/ML
4 INJECTION, SOLUTION INTRAVENOUS EVERY 6 HOURS PRN
Status: DISCONTINUED | OUTPATIENT
Start: 2025-05-05 | End: 2025-05-06

## 2025-05-05 RX ORDER — LIDOCAINE HYDROCHLORIDE 10 MG/ML
30 INJECTION, SOLUTION INFILTRATION; PERINEURAL ONCE AS NEEDED
Status: DISCONTINUED | OUTPATIENT
Start: 2025-05-05 | End: 2025-05-06 | Stop reason: HOSPADM

## 2025-05-05 RX ORDER — METHYLERGONOVINE MALEATE 0.2 MG/ML
0.2 INJECTION INTRAVENOUS ONCE AS NEEDED
Status: DISCONTINUED | OUTPATIENT
Start: 2025-05-05 | End: 2025-05-06 | Stop reason: HOSPADM

## 2025-05-05 RX ORDER — ONDANSETRON 4 MG/1
4 TABLET, FILM COATED ORAL EVERY 6 HOURS PRN
Status: DISCONTINUED | OUTPATIENT
Start: 2025-05-05 | End: 2025-05-06

## 2025-05-05 RX ORDER — LOPERAMIDE HYDROCHLORIDE 2 MG/1
4 CAPSULE ORAL EVERY 2 HOUR PRN
Status: DISCONTINUED | OUTPATIENT
Start: 2025-05-05 | End: 2025-05-06 | Stop reason: HOSPADM

## 2025-05-05 RX ADMIN — Medication 10 ML/HR: at 22:33

## 2025-05-05 RX ADMIN — Medication 3 ML: at 23:00

## 2025-05-05 RX ADMIN — Medication 4 ML: at 22:34

## 2025-05-05 RX ADMIN — SODIUM CHLORIDE, SODIUM LACTATE, POTASSIUM CHLORIDE, AND CALCIUM CHLORIDE 75 ML/HR: .6; .31; .03; .02 INJECTION, SOLUTION INTRAVENOUS at 21:42

## 2025-05-05 RX ADMIN — LIDOCAINE HYDROCHLORIDE AND EPINEPHRINE 3 ML: 15; 5 INJECTION, SOLUTION EPIDURAL at 22:31

## 2025-05-05 SDOH — SOCIAL STABILITY: SOCIAL INSECURITY
WITHIN THE LAST YEAR, HAVE YOU BEEN RAPED OR FORCED TO HAVE ANY KIND OF SEXUAL ACTIVITY BY YOUR PARTNER OR EX-PARTNER?: NO

## 2025-05-05 SDOH — ECONOMIC STABILITY: FOOD INSECURITY: WITHIN THE PAST 12 MONTHS, YOU WORRIED THAT YOUR FOOD WOULD RUN OUT BEFORE YOU GOT THE MONEY TO BUY MORE.: NEVER TRUE

## 2025-05-05 SDOH — SOCIAL STABILITY: SOCIAL INSECURITY: WITHIN THE LAST YEAR, HAVE YOU BEEN HUMILIATED OR EMOTIONALLY ABUSED IN OTHER WAYS BY YOUR PARTNER OR EX-PARTNER?: NO

## 2025-05-05 SDOH — HEALTH STABILITY: MENTAL HEALTH: SUICIDAL BEHAVIOR (LIFETIME): NO

## 2025-05-05 SDOH — SOCIAL STABILITY: SOCIAL INSECURITY: DO YOU FEEL ANYONE HAS EXPLOITED OR TAKEN ADVANTAGE OF YOU FINANCIALLY OR OF YOUR PERSONAL PROPERTY?: NO

## 2025-05-05 SDOH — HEALTH STABILITY: MENTAL HEALTH: WISH TO BE DEAD (PAST 1 MONTH): NO

## 2025-05-05 SDOH — HEALTH STABILITY: MENTAL HEALTH: NON-SPECIFIC ACTIVE SUICIDAL THOUGHTS (PAST 1 MONTH): NO

## 2025-05-05 SDOH — SOCIAL STABILITY: SOCIAL INSECURITY: VERBAL ABUSE: DENIES

## 2025-05-05 SDOH — SOCIAL STABILITY: SOCIAL INSECURITY: WITHIN THE LAST YEAR, HAVE YOU BEEN AFRAID OF YOUR PARTNER OR EX-PARTNER?: NO

## 2025-05-05 SDOH — SOCIAL STABILITY: SOCIAL INSECURITY
WITHIN THE LAST YEAR, HAVE YOU BEEN KICKED, HIT, SLAPPED, OR OTHERWISE PHYSICALLY HURT BY YOUR PARTNER OR EX-PARTNER?: NO

## 2025-05-05 SDOH — SOCIAL STABILITY: SOCIAL INSECURITY: DOES ANYONE TRY TO KEEP YOU FROM HAVING/CONTACTING OTHER FRIENDS OR DOING THINGS OUTSIDE YOUR HOME?: NO

## 2025-05-05 SDOH — HEALTH STABILITY: MENTAL HEALTH: HAVE YOU USED ANY PRESCRIPTION DRUGS OTHER THAN PRESCRIBED IN THE PAST 12 MONTHS?: NO

## 2025-05-05 SDOH — ECONOMIC STABILITY: FOOD INSECURITY: WITHIN THE PAST 12 MONTHS, THE FOOD YOU BOUGHT JUST DIDN'T LAST AND YOU DIDN'T HAVE MONEY TO GET MORE.: NEVER TRUE

## 2025-05-05 SDOH — SOCIAL STABILITY: SOCIAL INSECURITY: HAVE YOU HAD ANY THOUGHTS OF HARMING ANYONE ELSE?: NO

## 2025-05-05 SDOH — HEALTH STABILITY: MENTAL HEALTH: CURRENT SMOKER: 0

## 2025-05-05 SDOH — SOCIAL STABILITY: SOCIAL INSECURITY: PHYSICAL ABUSE: DENIES

## 2025-05-05 SDOH — SOCIAL STABILITY: SOCIAL INSECURITY: ARE THERE ANY APPARENT SIGNS OF INJURIES/BEHAVIORS THAT COULD BE RELATED TO ABUSE/NEGLECT?: NO

## 2025-05-05 SDOH — ECONOMIC STABILITY: HOUSING INSECURITY: DO YOU FEEL UNSAFE GOING BACK TO THE PLACE WHERE YOU ARE LIVING?: NO

## 2025-05-05 SDOH — HEALTH STABILITY: MENTAL HEALTH: WERE YOU ABLE TO COMPLETE ALL THE BEHAVIORAL HEALTH SCREENINGS?: YES

## 2025-05-05 SDOH — SOCIAL STABILITY: SOCIAL INSECURITY: ABUSE SCREEN: ADULT

## 2025-05-05 SDOH — SOCIAL STABILITY: SOCIAL INSECURITY: ARE YOU OR HAVE YOU BEEN THREATENED OR ABUSED PHYSICALLY, EMOTIONALLY, OR SEXUALLY BY ANYONE?: NO

## 2025-05-05 SDOH — SOCIAL STABILITY: SOCIAL INSECURITY: HAS ANYONE EVER THREATENED TO HURT YOUR FAMILY OR YOUR PETS?: NO

## 2025-05-05 SDOH — HEALTH STABILITY: MENTAL HEALTH: HAVE YOU USED ANY SUBSTANCES (CANABIS, COCAINE, HEROIN, HALLUCINOGENS, INHALANTS, ETC.) IN THE PAST 12 MONTHS?: NO

## 2025-05-05 SDOH — SOCIAL STABILITY: SOCIAL INSECURITY: HAVE YOU HAD THOUGHTS OF HARMING ANYONE ELSE?: NO

## 2025-05-05 ASSESSMENT — LIFESTYLE VARIABLES
HOW OFTEN DO YOU HAVE 6 OR MORE DRINKS ON ONE OCCASION: NEVER
HOW MANY STANDARD DRINKS CONTAINING ALCOHOL DO YOU HAVE ON A TYPICAL DAY: PATIENT DOES NOT DRINK
AUDIT-C TOTAL SCORE: 0
HOW OFTEN DO YOU HAVE A DRINK CONTAINING ALCOHOL: NEVER
AUDIT-C TOTAL SCORE: 0
SKIP TO QUESTIONS 9-10: 1

## 2025-05-05 ASSESSMENT — PATIENT HEALTH QUESTIONNAIRE - PHQ9
2. FEELING DOWN, DEPRESSED OR HOPELESS: NOT AT ALL
1. LITTLE INTEREST OR PLEASURE IN DOING THINGS: NOT AT ALL
SUM OF ALL RESPONSES TO PHQ9 QUESTIONS 1 & 2: 0

## 2025-05-05 ASSESSMENT — PAIN SCALES - GENERAL
PAINLEVEL_OUTOF10: 0 - NO PAIN
PAINLEVEL_OUTOF10: 0 - NO PAIN
PAINLEVEL_OUTOF10: 5 - MODERATE PAIN
PAINLEVEL_OUTOF10: 0 - NO PAIN

## 2025-05-05 ASSESSMENT — ACTIVITIES OF DAILY LIVING (ADL): LACK_OF_TRANSPORTATION: NO

## 2025-05-05 NOTE — H&P
OB Admission H&P    ASSESSMENT & PLAN: Zayda Dotson is a 29 y.o.  at 38w5d who is admitted for Labor    Plan  -Admit to L&D, consented  -T&S, CBC, and Syphilis  -Epidural at patient request  -Recheck as clinically indicated by maternal or fetal status    Fetal Status  -NST reactive, reassuring   -Presentation cephalic based on Ultrasound  -EFW 6#8oz by leopold's  -GBS negative    -Postpartum Contraception Plan: NFP/condoms    Melissa L Zahorsky, APRN-CNM    Vinayak Priest is a 29 y.o.  at 38w5d who is admitted for Labor. She reports good fetal movement. C/O bloody show, having contractions q 3 minutes. Reports small amount leaking of fluid at 1730. No continued leaking.    Prenatal Provider DAVID Spicer    Pregnancy Problems (from 10/03/24 to present)       Problem Noted Diagnosed Resolved    Rh negative state in antepartum period ([facility]) 10/5/2024 by FREDDY Juarez  No    Priority:  Medium       Overview Addendum 3/18/2025  3:07 PM by FREDDY Juarez   [x] 28 week Rhogam         Not immune to rubella 10/5/2024 by FREDDY Juarez  No    Priority:  Medium       Overview Signed 10/5/2024  1:45 PM by FREDDY Juarez   Rubella equivocal  Will offer MMR postpartum         37 weeks gestation of pregnancy ([facility]) 10/3/2024 by FREDDY Juarez  No    Priority:  Medium       Overview Addendum 2025  9:15 AM by FREDDY Juarez   [place] [facility] [place] provider in labor: [x] MARNIEM  [] Physician  [x] Blood Products: [x] Yes, accepts [] No, needs counseling  [x] Initial BMI: 20.99   [x] Prenatal Labs: wnl with exception of rubella equivocal status  [x] Cervical Cancer Screening up to date: next due 2026  [x] Rh status: Neg  [x] Genetic Screening: cffdna wnl, girl  [x] NT US: (11-13 wks) 0.8mm wnl  [x] Baby ASA (if indicated): not indicated  [x] Pregnancy dated by: LMP = 12w6d scan    [x] Anatomy US: (19-20 wks): wnl  [x] 1hr GCT at  24-28wks: passed  [x] Rhogam (if indicated): 3/4  [x] Tdap (27-32 wks, may be given up to 36 wks if initial window missed):   [x] Flu Vaccine: 10/3  [x] Birthtracks     [x] Breastfeeding:  [x] Postpartum Birth control method: cycle tracking / condoms  [x] GBS at 36 - 37 wks: neg  [x] 39 weeks discussion of IOL vs. Expectant management: Expectant until 41 weeks  [x] Mode of delivery ( anticipated ): Vaginal, desires NCB           Elevated blood pressure reading in office without diagnosis of hypertension 10/3/2024 by FREDDY Juarez  No    Priority:  Medium       Overview Addendum 10/5/2024  1:45 PM by FREDDY Juarez   /83 at NOB visit, 8w1d gestation  Baseline PEC labs wnl         Anxiety disorder affecting pregnancy, antepartum 10/3/2024 by FREDDY Juarez  No    Priority:  Medium       Overview Signed 10/3/2024  8:50 AM by FREDDY Juarez   Pt takes 20mg Lexapro daily  [facility] to [facility]                 OB History    Para Term  AB Living   1 0 0 0 0 0   SAB IAB Ectopic Multiple Live Births   0 0 0 0 0      # Outcome Date GA Lbr Conor/2nd Weight Sex Type Anes PTL Lv   1 Current                Surgical History[1]    Social History     Tobacco Use    Smoking status: Never    Smokeless tobacco: Never   Substance Use Topics    Alcohol use: Not Currently     Alcohol/week: 5.0 standard drinks of alcohol     Types: 5 Glasses of wine per week     Comment: socially       Allergies[2]    Prescriptions Prior to Admission[3]  Objective     Last Vitals  Temp Pulse Resp BP MAP O2 Sat   36.1 °C (97 °F) 72 18 131/84 103 100 %         Physical Exam  General: NAD, mood appropriate  Cardiopulmonary: warm and well perfused, breathing comfortably on room air  Abdomen: Gravid, non-tender  Extremities: Symmetric  Speculum Exam: no pooling of fluid seen, bloody mucous visualized, Nitrazine test is negative, Ferning test is negative  Cervix: 5 /70 /-2       Fetal Monitoring  Baseline: 140 bpm, Variability: Moderate, Accelerations: Present and Decelerations: None  Uterine Activity: Contractions present, q2-4 minutes  Interpretation: Category 1    Bedside Ultrasound: YES, Findings: cephalic presentation    Labs in chart were reviewed.          Prenatal labs reviewed, remarkable for rH negative, rubella non-immune                [1]   Past Surgical History:  Procedure Laterality Date    OTHER SURGICAL HISTORY  06/01/2020    No history of surgery   [2] No Known Allergies  [3]   Medications Prior to Admission   Medication Sig Dispense Refill Last Dose/Taking    escitalopram (Lexapro) 20 mg tablet Take 1 tablet (20 mg) by mouth once daily. 90 tablet 3 5/4/2025    prenatal no115/iron/folic acid (PRENATAL 19 ORAL) Take by mouth.   5/4/2025

## 2025-05-05 NOTE — PROGRESS NOTES
OB Admission H&P    ASSESSMENT & PLAN: Zayda Dotson is a 29 y.o.  at 38w5d who is admitted for Labor    Plan  -Admit to L&D, consented  -T&S, CBC, and Syphilis  -Epidural at patient request  -Recheck as clinically indicated by maternal or fetal status    Fetal Status  -NST reactive, reassuring   -Presentation cephalic based on Ultrasound  -EFW 6#8oz by leopold's  -GBS negative    -Postpartum Contraception Plan: NFP/condoms    Melissa L Zahorsky, APRN-CNM    Vinayak Priest is a 29 y.o.  at 38w5d who is admitted for Labor. She reports good fetal movement. C/O bloody show, having contractions q 3 minutes. Reports small amount leaking of fluid at 1730. No continued leaking.    Prenatal Provider DAVID Spicer    Pregnancy Problems (from 10/03/24 to present)       Problem Noted Diagnosed Resolved    Rh negative state in antepartum period ([facility]) 10/5/2024 by FREDDY Juarez  No    Priority:  Medium       Overview Addendum 3/18/2025  3:07 PM by FREDDY Juarez   [x] 28 week Rhogam         Not immune to rubella 10/5/2024 by FREDDY Juarez  No    Priority:  Medium       Overview Signed 10/5/2024  1:45 PM by FREDDY Juarez   Rubella equivocal  Will offer MMR postpartum         37 weeks gestation of pregnancy ([facility]) 10/3/2024 by FREDDY Juarez  No    Priority:  Medium       Overview Addendum 2025  9:15 AM by FREDDY Juarez   [place] [facility] [place]    Desired provider in labor: [x] MARNIEM  [] Physician  [x] Blood Products: [x] Yes, accepts [] No, needs counseling  [x] Initial BMI: 20.99   [x] Prenatal Labs: wnl with exception of rubella equivocal status  [x] Cervical Cancer Screening up to date: next due 2026  [x] Rh status: Neg  [x] Genetic Screening: cffdna wnl, girl  [x] NT US: (11-13 wks) 0.8mm wnl  [x] Baby ASA (if indicated): not indicated  [x] Pregnancy dated by: LMP = 12w6d scan    [x] Anatomy US: (19-20 wks): wnl  [x] 1hr GCT at  24-28wks: passed  [x] Rhogam (if indicated): 3/4  [x] Tdap (27-32 wks, may be given up to 36 wks if initial window missed):   [x] Flu Vaccine: 10/3  [x] Birthtracks     [x] Breastfeeding:  [x] Postpartum Birth control method: cycle tracking / condoms  [x] GBS at 36 - 37 wks: neg  [x] 39 weeks discussion of IOL vs. Expectant management: Expectant until 41 weeks  [x] Mode of delivery ( anticipated ): Vaginal, desires NCB           Elevated blood pressure reading in office without diagnosis of hypertension 10/3/2024 by FREDDY Juarez  No    Priority:  Medium       Overview Addendum 10/5/2024  1:45 PM by FREDDY Juarez   /83 at NOB visit, 8w1d gestation  Baseline PEC labs wnl         Anxiety disorder affecting pregnancy, antepartum 10/3/2024 by FREDDY Juarez  No    Priority:  Medium       Overview Signed 10/3/2024  8:50 AM by FREDDY Juarez   Pt takes 20mg Lexapro daily  [facility]                 OB History    Para Term  AB Living   1 0 0 0 0 0   SAB IAB Ectopic Multiple Live Births   0 0 0 0 0      # Outcome Date GA Lbr Conor/2nd Weight Sex Type Anes PTL Lv   1 Current                Surgical History[1]    Social History     Tobacco Use    Smoking status: Never    Smokeless tobacco: Never   Substance Use Topics    Alcohol use: Not Currently     Alcohol/week: 5.0 standard drinks of alcohol     Types: 5 Glasses of wine per week     Comment: socially       Allergies[2]    Prescriptions Prior to Admission[3]  Objective     Last Vitals  Temp Pulse Resp BP MAP O2 Sat   36.1 °C (97 °F) 72 18 131/84 103 100 %         Physical Exam  General: NAD, mood appropriate  Cardiopulmonary: warm and well perfused, breathing comfortably on room air  Abdomen: Gravid, non-tender  Extremities: Symmetric  Speculum Exam: no pooling of fluid seen, bloody mucous visualized, Nitrazine test is negative, Ferning test is negative  Cervix: 5 /70 /-2       Fetal Monitoring  Baseline: 140 bpm, Variability: Moderate, Accelerations: Present and Decelerations: None  Uterine Activity: Contractions present, q2-4 minutes  Interpretation: Category 1    Bedside Ultrasound: YES, Findings: cephalic presentation    Labs in chart were reviewed.          Prenatal labs reviewed, remarkable for rH negative, rubella non-immune                [1]   Past Surgical History:  Procedure Laterality Date    OTHER SURGICAL HISTORY  06/01/2020    No history of surgery   [2] No Known Allergies  [3]   Medications Prior to Admission   Medication Sig Dispense Refill Last Dose/Taking    escitalopram (Lexapro) 20 mg tablet Take 1 tablet (20 mg) by mouth once daily. 90 tablet 3 5/4/2025    prenatal no115/iron/folic acid (PRENATAL 19 ORAL) Take by mouth.   5/4/2025

## 2025-05-05 NOTE — TELEPHONE ENCOUNTER
Received call from Zayda, identity confirmed x2. aZyda reports onset of contractions this morning that were every 6-7 minutes and have been increasing in frequency and duration; some coming every 3-4 minutes. States they feel like mild period cramps. Calling because she isn't sure when to come in. During 7 minute call with Zayda and her , no contractions occurred, patient speaking clear and calmly, in no acute distress. Reports good fetal movement, denies leaking of fluid or vaginal bleeding. States she would like to be in the comfort of her own home for early labor. Labor precautions including 5-1-1 rule discussed, advised she is more than welcome to come in at any time and may call back as needed should she have any additional questions or concerns. Zayda and  verbalizes understanding. No other questions at this time, plans to continue to monitor contractions at home.     Melissa L Zahorsky, DEBBY-DAVID

## 2025-05-05 NOTE — ANESTHESIA PREPROCEDURE EVALUATION
Patient: Zayda Dotson    Evaluation Method: In-person visit    Procedure Information    Date: 05/05/25  Procedure: Labor Consult         Relevant Problems   Anesthesia  No prior anesthesia; no family hx issues with anesthesia      Cardiac (within normal limits)      Pulmonary (within normal limits)      Neuro   (+) Anxiety   (+) Anxiety disorder affecting pregnancy, antepartum      GI (within normal limits)      /Renal (within normal limits)      Liver (within normal limits)      Endocrine (within normal limits)      Hematology (within normal limits)      Musculoskeletal (within normal limits)      GYN   (+) 37 weeks gestation of pregnancy ([facility])       Clinical information reviewed:    Allergies  Meds               NPO Detail:  Still eating at time of interview  Visit Vitals  /78   Pulse 81   Temp 36.1 °C (97 °F)   Resp 18          OB/Gyn Evaluation    Present Pregnancy    Pregnant now: G1 @ 38.6 weeks for labor.   Obstetric History                Physical Exam    Airway  Mallampati: I  TM distance: >3 FB  Neck ROM: full  Mouth opening: 3 or more finger widths     Cardiovascular    Dental   Comments: Denies dental issues.     Pulmonary    Abdominal            Anesthesia Plan    History of general anesthesia?: no  History of complications of general anesthesia?: no    ASA 2     epidural     The patient is not a current smoker.    Anesthetic plan and risks discussed with patient.  Use of blood products discussed with patient who consented to blood products.

## 2025-05-06 ENCOUNTER — APPOINTMENT (OUTPATIENT)
Dept: OBSTETRICS AND GYNECOLOGY | Facility: CLINIC | Age: 30
End: 2025-05-06
Payer: COMMERCIAL

## 2025-05-06 PROBLEM — Z3A.38 38 WEEKS GESTATION OF PREGNANCY (HHS-HCC): Status: ACTIVE | Noted: 2024-10-03

## 2025-05-06 LAB
BB ANTIBODY IDENTIFICATION: NORMAL
CASE #: NORMAL
PATH REV-IMMUNOHEMATOLOGY-PR30: NORMAL

## 2025-05-06 PROCEDURE — 59409 OBSTETRICAL CARE: CPT | Performed by: NURSE PRACTITIONER

## 2025-05-06 PROCEDURE — 2500000004 HC RX 250 GENERAL PHARMACY W/ HCPCS (ALT 636 FOR OP/ED): Mod: JZ

## 2025-05-06 PROCEDURE — 1100000001 HC PRIVATE ROOM DAILY

## 2025-05-06 PROCEDURE — 36415 COLL VENOUS BLD VENIPUNCTURE: CPT | Performed by: NURSE PRACTITIONER

## 2025-05-06 PROCEDURE — 51701 INSERT BLADDER CATHETER: CPT

## 2025-05-06 PROCEDURE — 59050 FETAL MONITOR W/REPORT: CPT

## 2025-05-06 PROCEDURE — 2500000004 HC RX 250 GENERAL PHARMACY W/ HCPCS (ALT 636 FOR OP/ED): Mod: JZ | Performed by: NURSE PRACTITIONER

## 2025-05-06 PROCEDURE — 3E0234Z INTRODUCTION OF SERUM, TOXOID AND VACCINE INTO MUSCLE, PERCUTANEOUS APPROACH: ICD-10-PCS | Performed by: NURSE PRACTITIONER

## 2025-05-06 PROCEDURE — 59400 OBSTETRICAL CARE: CPT | Performed by: NURSE PRACTITIONER

## 2025-05-06 PROCEDURE — 7100000016 HC LABOR RECOVERY PER HOUR

## 2025-05-06 PROCEDURE — 10907ZC DRAINAGE OF AMNIOTIC FLUID, THERAPEUTIC FROM PRODUCTS OF CONCEPTION, VIA NATURAL OR ARTIFICIAL OPENING: ICD-10-PCS | Performed by: ADVANCED PRACTICE MIDWIFE

## 2025-05-06 PROCEDURE — 85461 HEMOGLOBIN FETAL: CPT

## 2025-05-06 PROCEDURE — 0UQGXZZ REPAIR VAGINA, EXTERNAL APPROACH: ICD-10-PCS | Performed by: NURSE PRACTITIONER

## 2025-05-06 PROCEDURE — 2720000007 HC OR 272 NO HCPCS

## 2025-05-06 PROCEDURE — 7210000002 HC LABOR PER HOUR

## 2025-05-06 PROCEDURE — 2500000001 HC RX 250 WO HCPCS SELF ADMINISTERED DRUGS (ALT 637 FOR MEDICARE OP): Performed by: NURSE PRACTITIONER

## 2025-05-06 RX ORDER — ONDANSETRON 4 MG/1
4 TABLET, FILM COATED ORAL EVERY 6 HOURS PRN
Status: DISCONTINUED | OUTPATIENT
Start: 2025-05-06 | End: 2025-05-09 | Stop reason: HOSPADM

## 2025-05-06 RX ORDER — TRANEXAMIC ACID 100 MG/ML
1000 INJECTION, SOLUTION INTRAVENOUS ONCE AS NEEDED
Status: ACTIVE | OUTPATIENT
Start: 2025-05-06 | End: 2025-05-07

## 2025-05-06 RX ORDER — LOPERAMIDE HYDROCHLORIDE 2 MG/1
4 CAPSULE ORAL EVERY 2 HOUR PRN
Status: DISCONTINUED | OUTPATIENT
Start: 2025-05-06 | End: 2025-05-09 | Stop reason: HOSPADM

## 2025-05-06 RX ORDER — OXYTOCIN 10 [USP'U]/ML
10 INJECTION, SOLUTION INTRAMUSCULAR; INTRAVENOUS ONCE AS NEEDED
Status: DISCONTINUED | OUTPATIENT
Start: 2025-05-06 | End: 2025-05-09 | Stop reason: HOSPADM

## 2025-05-06 RX ORDER — ESCITALOPRAM OXALATE 20 MG/1
20 TABLET ORAL ONCE
Status: DISCONTINUED | OUTPATIENT
Start: 2025-05-06 | End: 2025-05-06

## 2025-05-06 RX ORDER — ADHESIVE BANDAGE
10 BANDAGE TOPICAL
Status: DISCONTINUED | OUTPATIENT
Start: 2025-05-06 | End: 2025-05-09 | Stop reason: HOSPADM

## 2025-05-06 RX ORDER — ESCITALOPRAM OXALATE 20 MG/1
20 TABLET ORAL NIGHTLY
Status: DISCONTINUED | OUTPATIENT
Start: 2025-05-06 | End: 2025-05-09 | Stop reason: HOSPADM

## 2025-05-06 RX ORDER — ONDANSETRON HYDROCHLORIDE 2 MG/ML
4 INJECTION, SOLUTION INTRAVENOUS EVERY 6 HOURS PRN
Status: DISCONTINUED | OUTPATIENT
Start: 2025-05-06 | End: 2025-05-09 | Stop reason: HOSPADM

## 2025-05-06 RX ORDER — OXYTOCIN/0.9 % SODIUM CHLORIDE 30/500 ML
2-30 PLASTIC BAG, INJECTION (ML) INTRAVENOUS CONTINUOUS
Status: DISCONTINUED | OUTPATIENT
Start: 2025-05-06 | End: 2025-05-06

## 2025-05-06 RX ORDER — NIFEDIPINE 10 MG/1
10 CAPSULE ORAL ONCE AS NEEDED
Status: DISCONTINUED | OUTPATIENT
Start: 2025-05-06 | End: 2025-05-09 | Stop reason: HOSPADM

## 2025-05-06 RX ORDER — HYDRALAZINE HYDROCHLORIDE 20 MG/ML
5 INJECTION INTRAMUSCULAR; INTRAVENOUS ONCE AS NEEDED
Status: DISCONTINUED | OUTPATIENT
Start: 2025-05-06 | End: 2025-05-09 | Stop reason: HOSPADM

## 2025-05-06 RX ORDER — POLYETHYLENE GLYCOL 3350 17 G/17G
17 POWDER, FOR SOLUTION ORAL 2 TIMES DAILY PRN
Status: DISCONTINUED | OUTPATIENT
Start: 2025-05-06 | End: 2025-05-09 | Stop reason: HOSPADM

## 2025-05-06 RX ORDER — DIPHENHYDRAMINE HCL 25 MG
25 CAPSULE ORAL EVERY 6 HOURS PRN
Status: DISCONTINUED | OUTPATIENT
Start: 2025-05-06 | End: 2025-05-09 | Stop reason: HOSPADM

## 2025-05-06 RX ORDER — CARBOPROST TROMETHAMINE 250 UG/ML
250 INJECTION, SOLUTION INTRAMUSCULAR ONCE AS NEEDED
Status: DISCONTINUED | OUTPATIENT
Start: 2025-05-06 | End: 2025-05-09 | Stop reason: HOSPADM

## 2025-05-06 RX ORDER — BISACODYL 10 MG/1
10 SUPPOSITORY RECTAL DAILY PRN
Status: DISCONTINUED | OUTPATIENT
Start: 2025-05-06 | End: 2025-05-09 | Stop reason: HOSPADM

## 2025-05-06 RX ORDER — METHYLERGONOVINE MALEATE 0.2 MG/ML
0.2 INJECTION INTRAVENOUS ONCE AS NEEDED
Status: DISCONTINUED | OUTPATIENT
Start: 2025-05-06 | End: 2025-05-09 | Stop reason: HOSPADM

## 2025-05-06 RX ORDER — SIMETHICONE 80 MG
80 TABLET,CHEWABLE ORAL 4 TIMES DAILY PRN
Status: DISCONTINUED | OUTPATIENT
Start: 2025-05-06 | End: 2025-05-09 | Stop reason: HOSPADM

## 2025-05-06 RX ORDER — ACETAMINOPHEN 325 MG/1
975 TABLET ORAL EVERY 6 HOURS
Status: DISCONTINUED | OUTPATIENT
Start: 2025-05-06 | End: 2025-05-09 | Stop reason: HOSPADM

## 2025-05-06 RX ORDER — DIPHENHYDRAMINE HYDROCHLORIDE 50 MG/ML
25 INJECTION, SOLUTION INTRAMUSCULAR; INTRAVENOUS EVERY 6 HOURS PRN
Status: DISCONTINUED | OUTPATIENT
Start: 2025-05-06 | End: 2025-05-09 | Stop reason: HOSPADM

## 2025-05-06 RX ORDER — MISOPROSTOL 200 UG/1
800 TABLET ORAL ONCE AS NEEDED
Status: DISCONTINUED | OUTPATIENT
Start: 2025-05-06 | End: 2025-05-09 | Stop reason: HOSPADM

## 2025-05-06 RX ORDER — LABETALOL HYDROCHLORIDE 5 MG/ML
20 INJECTION, SOLUTION INTRAVENOUS ONCE AS NEEDED
Status: DISCONTINUED | OUTPATIENT
Start: 2025-05-06 | End: 2025-05-09 | Stop reason: HOSPADM

## 2025-05-06 RX ORDER — IBUPROFEN 600 MG/1
600 TABLET ORAL EVERY 6 HOURS
Status: DISCONTINUED | OUTPATIENT
Start: 2025-05-06 | End: 2025-05-09 | Stop reason: HOSPADM

## 2025-05-06 RX ADMIN — IBUPROFEN 600 MG: 600 TABLET ORAL at 15:54

## 2025-05-06 RX ADMIN — ACETAMINOPHEN 975 MG: 325 TABLET, FILM COATED ORAL at 21:34

## 2025-05-06 RX ADMIN — SODIUM CHLORIDE, SODIUM LACTATE, POTASSIUM CHLORIDE, AND CALCIUM CHLORIDE 500 ML: .6; .31; .03; .02 INJECTION, SOLUTION INTRAVENOUS at 05:36

## 2025-05-06 RX ADMIN — HUMAN RHO(D) IMMUNE GLOBULIN 300 MCG: 300 INJECTION, SOLUTION INTRAMUSCULAR at 21:28

## 2025-05-06 RX ADMIN — IBUPROFEN 600 MG: 600 TABLET ORAL at 21:35

## 2025-05-06 RX ADMIN — Medication 60 MILLI-UNITS/MIN: at 14:01

## 2025-05-06 RX ADMIN — ACETAMINOPHEN 975 MG: 325 TABLET, FILM COATED ORAL at 15:54

## 2025-05-06 RX ADMIN — SODIUM CHLORIDE, SODIUM LACTATE, POTASSIUM CHLORIDE, AND CALCIUM CHLORIDE 500 ML: 600; 310; 30; 20 INJECTION, SOLUTION INTRAVENOUS at 17:25

## 2025-05-06 RX ADMIN — ESCITALOPRAM 20 MG: 20 TABLET, FILM COATED ORAL at 21:34

## 2025-05-06 RX ADMIN — Medication 2 MILLI-UNITS/MIN: at 10:38

## 2025-05-06 ASSESSMENT — PAIN SCALES - GENERAL
PAINLEVEL_OUTOF10: 2
PAINLEVEL_OUTOF10: 3
PAINLEVEL_OUTOF10: 0 - NO PAIN
PAINLEVEL_OUTOF10: 3
PAINLEVEL_OUTOF10: 3
PAINLEVEL_OUTOF10: 0 - NO PAIN
PAINLEVEL_OUTOF10: 3
PAINLEVEL_OUTOF10: 0 - NO PAIN
PAINLEVEL_OUTOF10: 0 - NO PAIN
PAINLEVEL_OUTOF10: 3
PAINLEVEL_OUTOF10: 0 - NO PAIN
PAINLEVEL_OUTOF10: 3
PAINLEVEL_OUTOF10: 3
PAINLEVEL_OUTOF10: 0 - NO PAIN

## 2025-05-06 ASSESSMENT — PAIN DESCRIPTION - DESCRIPTORS
DESCRIPTORS: SORE
DESCRIPTORS: ACHING

## 2025-05-06 ASSESSMENT — PAIN - FUNCTIONAL ASSESSMENT: PAIN_FUNCTIONAL_ASSESSMENT: 0-10

## 2025-05-06 NOTE — ANESTHESIA PREPROCEDURE EVALUATION
Patient: Zayda Dotson    Evaluation Method: In-person visit    Procedure Information    Date: 05/05/25  Procedure: Labor Consult         Relevant Problems   Anesthesia  No prior anesthesia; no family hx issues with anesthesia      Cardiac (within normal limits)      Pulmonary (within normal limits)      Neuro   (+) Anxiety   (+) Anxiety disorder affecting pregnancy, antepartum      GI (within normal limits)      /Renal (within normal limits)      Liver (within normal limits)      Endocrine (within normal limits)      Hematology (within normal limits)      Musculoskeletal (within normal limits)      GYN   (+) 37 weeks gestation of pregnancy ([facility])       Clinical information reviewed:    Allergies  Meds               NPO Detail:  Still eating at time of interview  Visit Vitals  BP (!) 146/88   Pulse 97   Temp 36.1 °C (97 °F)   Resp 18          OB/Gyn Evaluation    Present Pregnancy    Pregnant now: G1 @ 38.6 weeks for labor.   Obstetric History                Physical Exam    Airway  Mallampati: I  TM distance: >3 FB  Neck ROM: full  Mouth opening: 3 or more finger widths     Cardiovascular    Dental   Comments: Denies dental issues.     Pulmonary    Abdominal            Anesthesia Plan    History of general anesthesia?: no  History of complications of general anesthesia?: no    ASA 2     epidural     The patient is not a current smoker.    Anesthetic plan and risks discussed with patient.  Use of blood products discussed with patient who consented to blood products.

## 2025-05-06 NOTE — ANESTHESIA PROCEDURE NOTES
Epidural Block    Patient location during procedure: OB  Start time: 5/5/2025 10:17 PM  End time: 5/5/2025 10:38 PM  Reason for block: labor analgesia  Staffing  Performed: resident   Authorized by: Rowan Paredes MD    Performed by: Rowan Paredes MD    Preanesthetic Checklist  Completed: patient identified, IV checked, risks and benefits discussed, surgical consent, pre-op evaluation, timeout performed and sterile techniques followed  Block Timeout  RN/Licensed healthcare professional reads aloud to the Anesthesia provider and entire team: Patient identity, procedure with side and site, patient position, and as applicable the availability of implants/special equipment/special requirements.  Patient on coagulant treatment: no  Timeout performed at: 5/5/2025 10:17 PM  Block Placement  Patient position: sitting  Prep: ChloraPrep  Sterility prep: cap, drape, gloves and mask  Sedation level: no sedation  Patient monitoring: blood pressure, continuous pulse oximetry and heart rate  Approach: midline  Local numbing: lidocaine 1% to skin and subcutaneous tissues  Vertebral space: lumbar  Lumbar location: L4-L5  Epidural  Loss of resistance technique: saline  Guidance: landmark technique        Needle  Needle type: Tuohy   Needle gauge: 17  Needle length: 8.9cm  Needle insertion depth: 4 cm  Catheter type: multi-orifice  Catheter size: 19 G  Catheter at skin depth: 9 cm  Catheter securement method: clear occlusive dressing and liquid medical adhesive    Test dose: lidocaine 1.5% with epinephrine 1-to-200,000  Test dose: lidocaine 1.5% with epinephrine 1-to-200,000  Test dose result: no positive test dose    PCEA  Medication concentration used: 0.2% Ropivacaine with 2 mcg/mL Fentanyl  Dose (mL): 4  Lockout (minutes): 30  1-Hour Limit (boluses/hr): 2  Basal Rate: 10        Assessment  Sensory level: T10 bilateral  Block outcome: patient comfortable  Number of attempts: 1  Events: no positive test dose  Procedure assessment:  patient tolerated procedure well with no immediate complications  Additional Notes  Initial level T11 on right and T9 on left. Patient placed in right lateral position with 3cc bolus given. Subsequent level T10 bilaterally with patient more comfortable

## 2025-05-06 NOTE — SIGNIFICANT EVENT
Pt. Overall comfortable, had good rest overnight.   SVE 9.5/90/+1, cervix from 12-6 on maternal right   FHT: Cat I   TOCO: Q 2-4     Leaking clear fluid     -Active labor, anticipate second stage  -Continue to monitor maternal/fetal status  -Anticipate     Melisa Win, DEBBY-MARNIEM

## 2025-05-06 NOTE — SIGNIFICANT EVENT
Pt. Comfortable s/p epidural. Ready to sleep.   SVE def, will discuss AROM with next exam as appropriate  FHT: Cat I   TOCO: Q 1-3 min  VSS    -Pt. Turned right lateral, encouraged rest  -Continue to monitor maternal/fetal status  -Anticipate     Melisa Win, DEBBY-MARNIEM

## 2025-05-06 NOTE — SIGNIFICANT EVENT
Pt. Has been resting.  at bedside. Amenable to cervical exam.   RN straight cath of 250ml  SVE 8/90/0, BBOW, +bloody show  FHT: 130, moderate, +accels, +decel while patient lying flat, resolved with position change to left side  TOCO: Q 3-5 min  Offered AROM, declines     Continue to monitor maternal/fetal status  Continue maternal positioning  Anticipate     Melisa Win, APRN-CNM

## 2025-05-06 NOTE — SIGNIFICANT EVENT
Assessment    29 y.o.  at 38w6d  FHT Category 1  Active labor  GBS neg  Plan    Discussed with patient recommendation for augmentation with pitocin to bring contraction frequency closer; patient agreeable  Encourage frequent position changes as tolerated  Augmentation with pitocin per protocol  Continue assessment of maternal and fetal wellbeing  Recheck as clinically indicated by maternal or fetal status  Anticipate second stage of labor    Sydney Langston, APRN-CNM, APRN-CNP    Subjective:  Zayda Dotson is comfortable with epidural, reports intermittent rectal pressure.     Objective:  Fetal Monitoring      Baseline FHR: 150 per minute  Variability: moderate  Accelerations: yes  Decelerations: none  TOCO: Contraction Frequency: 2-6     Cervical Exam: 9.5 cm dilated, 100 effaced, +1 station, lip from 8-12 o-clock, not reducible    Membrane Status:  (leaking)  Rupture Date: 25  Rupture Time: 0424  Fluid Color: Clear    Vitals:    25 0734 25 0839 25 0840 25 0935   BP:  128/66  125/60   Pulse: 98 89 89 78   Resp:  18  18   Temp:  36.6 °C (97.9 °F)  36.6 °C (97.9 °F)   TempSrc:  Temporal  Temporal   SpO2:  99%  99%   Weight:       Height:

## 2025-05-06 NOTE — CARE PLAN
The patient's goals for the shift include safe delivery of baby girl    The clinical goals for the shift include reassuring FHR and maternal vitals and a safe delivery    Over the shift, the patient met the following goals:    Problem: Vaginal Birth or  Section  Goal: Fetal and maternal status remain reassuring during the birth process  Outcome: Met  Goal: Prevention of malpresentation/labor dystocia through delivery  Outcome: Met  Goal: Demonstrates labor coping techniques through delivery  Outcome: Met  Goal: Minimal s/sx of HDP and BP<160/110  Outcome: Met  Goal: No s/sx of infection through recovery  Outcome: Met  Goal: No s/sx of hemorrhage through recovery  Outcome: Met     Problem: Nausea/Vomiting  Goal: Free from nausea/vomiting  Outcome: Met     Problem: Pain - Adult  Goal: Verbalizes/displays adequate comfort level or baseline comfort level  Outcome: Met     Problem: Safety - Adult  Goal: Free from fall injury  Outcome: Met

## 2025-05-06 NOTE — PROGRESS NOTES
Intrapartum Progress Note    Assessment/Plan   Zayda Dotson is a 29 y.o.  at 38w5d. EDIL: 2025, by Last Menstrual Period.     A:  Latent labor, approaching active  Cat I FHT  GBS negative    P:  Discussed nitrous oxide, would like trial   Supportive measures given   Continue to monitor maternal/fetal status   Anticipate     FREDDY Sofia     Assessment & Plan  Normal labor ([facility])    Pregnancy Problems (from 10/03/24 to present)       Problem Noted Diagnosed Resolved    Rh negative state in antepartum period ([facility]) 10/5/2024 by FREDDY Juarez  No    Priority:  Medium       Overview Addendum 3/18/2025  3:07 PM by FREDDY Juarez   [x] 28 week Rhogam         Not immune to rubella 10/5/2024 by FREDDY Juarez  No    Priority:  Medium       Overview Signed 10/5/2024  1:45 PM by FREDDY Juarez   Rubella equivocal  Will offer MMR postpartum         37 weeks gestation of pregnancy ([facility]) 10/3/2024 by FREDDY Juarez  No    Priority:  Medium       Overview Addendum 2025  9:15 AM by FREDDY Juarez   [facility] [place] [facility]    Desired provider in labor: [x] CNM  [] Physician  [x] Blood Products: [x] Yes, accepts [] No, needs counseling  [x] Initial BMI: 20.99   [x] Prenatal Labs: wnl with exception of rubella equivocal status  [x] Cervical Cancer Screening up to date: next due 2026  [x] Rh status: Neg  [x] Genetic Screening: cffdna wnl, girl  [x] NT US: (11-13 wks) 0.8mm wnl  [x] Baby ASA (if indicated): not indicated  [x] Pregnancy dated by: LMP = 12w6d scan    [x] Anatomy US: (19-20 wks): wnl  [x] 1hr GCT at 24-28wks: passed  [x] Rhogam (if indicated): 3/4  [x] Tdap (27-32 wks, may be given up to 36 wks if initial window missed): 2/18  [x] Flu Vaccine: 10/3  [x] Birthtracks     [x] Breastfeeding:  [x] Postpartum Birth control method: cycle tracking / condoms  [x] GBS at 36 - 37 wks: neg  [x] 39 weeks discussion  of IOL vs. Expectant management: Expectant until 41 weeks  [x] Mode of delivery ( anticipated ): Vaginal, desires NCB           Elevated blood pressure reading in office without diagnosis of hypertension 10/3/2024 by FREDDY Juarez  No    Priority:  Medium       Overview Addendum 10/5/2024  1:45 PM by FREDDY Juarez   /83 at NOB visit, 8w1d gestation  Baseline PEC labs wnl         Anxiety disorder affecting pregnancy, antepartum 10/3/2024 by FREDDY Juarez  No    Priority:  Medium       Overview Signed 10/3/2024  8:50 AM by FREDDY Juarez   Pt takes 20mg Lexapro daily  Referred to [facility]                 Subjective   Pt. Reports contractions are becoming more intense,  supportive at bedside  Coping well with breathing/position changes  Seen on team rounds with Dr. Tan and Dr. De La O   Requesting SVE    Objective   Last Vitals:  Temp Pulse Resp BP MAP Pulse Ox   36.1 °C (97 °F) 81 18 128/78 96 99 %     Vitals Min/Max Last 24 Hours:  Temp  Min: 36.1 °C (97 °F)  Max: 36.1 °C (97 °F)  Pulse  Min: 72  Max: 82  Resp  Min: 18  Max: 18  BP  Min: 128/78  Max: 131/84  MAP (mmHg)  Min: 96  Max: 103    Intake/Output:  No intake or output data in the 24 hours ending 05/05/25 2125    Physical Examination:  GENERAL: Examination reveals a well developed, well nourished, gravid female whose affect is appropriate. She is alert and cooperative.  FHR is 140 BPM, moderate variability, no decels with Accelerations, and a   Cat I FHT tracing.    Blanket reading:  Q 1-2 min  CERVIX: 6 cm dilated, 80 % effaced, -2 station; MEMBRANES are Intact  Soft, posterior   PSYCHOLOGICAL: awake and alert; oriented to person, place, and time    Chaperone Present: Yes.  Chaperone Name/Title: Ana ARROYO   Examination Chaperoned: Gynecological Exam    Lab Review:  Labs in chart were reviewed.

## 2025-05-06 NOTE — DISCHARGE INSTRUCTIONS

## 2025-05-06 NOTE — SIGNIFICANT EVENT
Reports she is starting to feel more contractions/occasional pressure.   SVE 8/90/0, BBOW  Amenable to AROM-->copious clear fluid  FHT: Cat I   TOCO Q 2-5 min    -Continue to monitor maternal/fetal status  -Continue maternal positioning  -Anticipate     Melisa Win, DEBBY-MARNIEM

## 2025-05-06 NOTE — L&D DELIVERY NOTE
Vaginal Delivery Note    Patient Name: Zayda Dotson  : 1995  MRN: [id number]  Age: 29 y.o.    /Para:   Gestational Age: 38w6d    Procedure: Normal Spontaneous Vaginal Delivery    Delivery Provider: Sydney Langston, APRRAFAEL-DAVID, APRN-CNP    Description of Procedure:  Zayda Dotson, a 29 y.o.  female delivered a viable Female infant with Apgars of 8  and 9 . Patient was fully dilated and pushing after 29 hours 21 minutes in labor. The patient was put in the dorsal lithotomy position. Fetal descent noted throughout pushing efforts. Hands-on perineum at  with controlled extension of fetal head. Shoulders delivered swiftly following restitution. Delivery was via Vaginal, Spontaneous to a sterile field under Epidural anesthesia. Infant delivered in Vertex Left Occiput Anterior position. Postpartum pitocin bolus initiated. Vigorous infant placed on maternal abdomen for skin to skin. Cord cut by FOB after delayed cord clamping. Placenta delivered spontaneously and intact with gentle traction on umbilical cord. Cord blood was obtained in routine fashion. Cord complications were:  none. Intact placenta delivered at 2025  1:24 PM. Fundal massage performed. Fundus palpated firm, midline, and at umbilicus. Perineum, vagina, cervix were inspected, and the following lacerations were noted:   MarasZa [[id number]]      Lacerations    Episiotomy: None  Perineal laceration: None  Vaginal laceration?: Yes  Vaginal laceration location: left  Vaginal laceration repaired?: Yes  Repair suture: 3-0 Synthetic Suture            Any lacerations were repaired in the usual fashion using 3-0 Synthetic Suture.   Excellent hemostasis was noted.   Needle count correct.   Infant and patient in delivery room in good and stable condition.     Findings:   Amniotic fluid Clear, Female infant in Vertex Occiput Anterior presentation, APGARS 8 , 9 .  Birth Weight 3.3 kg.    Complications: None    Quantitative  Blood Loss: 100 mL     Blood products: N/A    Uterotonics/Hemostatic Agent: IV Pitocin 30 units    Placenta  Delivered: 2025  1:24 PM  Appearance: Intact  Removal: Spontaneous    Disposition: discarded    Patient Disposition: Patient recovering on labor and delivery in stable condition.    Additional Procedures:  None    Lali Dotsonana rosa [[id number]]      Labor Events    Sac identifier: Sac 1  Rupture date/time: 2025  Rupture type: Artificial  Fluid color: Clear  Fluid odor: None  Labor type: Spontaneous Onset of Labor  Labor allowed to proceed with plans for an attempted vaginal birth?: Yes  Augmentation: AROM, Oxytocin  Augmentation date/time: 2025  Augmentation indications: Ineffective Contraction Pattern  Complications: None       Labor Event Times    Labor onset date/time: 2025 06  Dilation complete date/time: 2025 1121  Start pushing date/time: 2025 11:27       Labor Length    1st stage: 29h 21m  2nd stage: 1h 55m  3rd stage: 0h 07m       Placenta    Placenta delivery date/time: 2025 13:24  Placenta removal: Spontaneous  Placenta appearance: Intact  Placenta disposition: discarded       Cord    Vessels: 3 vessels  Complications: None  Delayed cord clamping?: Yes  Cord clamped date/time: 2025 13:17:00  Cord blood disposition: Discarded  Gases sent?: No  Stem cell collection (by provider): No       Lacerations    Episiotomy: None  Perineal laceration: None  Vaginal laceration?: Yes  Vaginal laceration location: left  Vaginal laceration repaired?: Yes  Repair suture: 3-0 Synthetic Suture       Anesthesia    Method: Epidural       Operative Delivery    Forceps attempted?: No  Vacuum extractor attempted?: No       Shoulder Dystocia    Shoulder dystocia present?: No       [place] Delivery    Time head delivered: 2025 13:16:00  Birth date/time: 2025 13:16:30  Delivery type: Vaginal, Spontaneous  Complications: None       Resuscitation    Method: Tactile stimulation,  Suctioning       Apgars    Living status: Living  Apgar Component Scores:  1 min.:  5 min.:  10 min.:  15 min.:  20 min.:    Skin color:  0  1       Heart rate:  2  2       Reflex irritability:  2  2       Muscle tone:  2  2       Respiratory effort:  2  2       Total:  8  9       Apgars assigned by: VINNIE DAIGLE RN       Delivery Providers    Delivering clinician: Sydney Langston V, APRN-CNM, APRN-CNP   Provider Role    Padmaja Valdez, RN, LISW Delivery Nurse    Natalia Daigle RN Nursery Nurse     Resident

## 2025-05-07 PROBLEM — O13.9 GESTATIONAL HYPERTENSION (HHS-HCC): Status: ACTIVE | Noted: 2025-05-07

## 2025-05-07 LAB
ALBUMIN SERPL BCP-MCNC: 2.9 G/DL (ref 3.4–5)
ALP SERPL-CCNC: 161 U/L (ref 33–110)
ALT SERPL W P-5'-P-CCNC: 8 U/L (ref 7–45)
ANION GAP SERPL CALC-SCNC: 11 MMOL/L (ref 10–20)
AST SERPL W P-5'-P-CCNC: 20 U/L (ref 9–39)
BILIRUB SERPL-MCNC: 0.5 MG/DL (ref 0–1.2)
BUN SERPL-MCNC: 9 MG/DL (ref 6–23)
CALCIUM SERPL-MCNC: 8.4 MG/DL (ref 8.6–10.6)
CHLORIDE SERPL-SCNC: 108 MMOL/L (ref 98–107)
CO2 SERPL-SCNC: 23 MMOL/L (ref 21–32)
CREAT SERPL-MCNC: 0.63 MG/DL (ref 0.5–1.05)
EGFRCR SERPLBLD CKD-EPI 2021: >90 ML/MIN/1.73M*2
ERYTHROCYTE [DISTWIDTH] IN BLOOD BY AUTOMATED COUNT: 13.5 % (ref 11.5–14.5)
FETAL MATERNAL SCREEN: NORMAL
GLUCOSE SERPL-MCNC: 69 MG/DL (ref 74–99)
HCT VFR BLD AUTO: 34.2 % (ref 36–46)
HGB BLD-MCNC: 11.7 G/DL (ref 12–16)
MCH RBC QN AUTO: 30.5 PG (ref 26–34)
MCHC RBC AUTO-ENTMCNC: 34.2 G/DL (ref 32–36)
MCV RBC AUTO: 89 FL (ref 80–100)
NRBC BLD-RTO: 0 /100 WBCS (ref 0–0)
PLATELET # BLD AUTO: 160 X10*3/UL (ref 150–450)
POTASSIUM SERPL-SCNC: 4.3 MMOL/L (ref 3.5–5.3)
PROT SERPL-MCNC: 5.5 G/DL (ref 6.4–8.2)
RBC # BLD AUTO: 3.83 X10*6/UL (ref 4–5.2)
SODIUM SERPL-SCNC: 138 MMOL/L (ref 136–145)
WBC # BLD AUTO: 16.9 X10*3/UL (ref 4.4–11.3)

## 2025-05-07 PROCEDURE — 80053 COMPREHEN METABOLIC PANEL: CPT

## 2025-05-07 PROCEDURE — 36415 COLL VENOUS BLD VENIPUNCTURE: CPT

## 2025-05-07 PROCEDURE — 85027 COMPLETE CBC AUTOMATED: CPT

## 2025-05-07 PROCEDURE — 1100000001 HC PRIVATE ROOM DAILY

## 2025-05-07 PROCEDURE — 2500000001 HC RX 250 WO HCPCS SELF ADMINISTERED DRUGS (ALT 637 FOR MEDICARE OP): Performed by: NURSE PRACTITIONER

## 2025-05-07 PROCEDURE — 2500000005 HC RX 250 GENERAL PHARMACY W/O HCPCS: Performed by: NURSE PRACTITIONER

## 2025-05-07 RX ADMIN — IBUPROFEN 600 MG: 600 TABLET ORAL at 10:21

## 2025-05-07 RX ADMIN — IBUPROFEN 600 MG: 600 TABLET ORAL at 22:15

## 2025-05-07 RX ADMIN — ACETAMINOPHEN 975 MG: 325 TABLET, FILM COATED ORAL at 16:24

## 2025-05-07 RX ADMIN — ACETAMINOPHEN 975 MG: 325 TABLET, FILM COATED ORAL at 04:03

## 2025-05-07 RX ADMIN — IBUPROFEN 600 MG: 600 TABLET ORAL at 04:03

## 2025-05-07 RX ADMIN — ACETAMINOPHEN 975 MG: 325 TABLET, FILM COATED ORAL at 22:13

## 2025-05-07 RX ADMIN — ESCITALOPRAM 20 MG: 20 TABLET, FILM COATED ORAL at 22:13

## 2025-05-07 RX ADMIN — IBUPROFEN 600 MG: 600 TABLET ORAL at 16:24

## 2025-05-07 RX ADMIN — WITCH HAZEL 1 EACH: 500 SOLUTION RECTAL; TOPICAL at 00:08

## 2025-05-07 RX ADMIN — ACETAMINOPHEN 975 MG: 325 TABLET, FILM COATED ORAL at 10:21

## 2025-05-07 ASSESSMENT — PAIN - FUNCTIONAL ASSESSMENT: PAIN_FUNCTIONAL_ASSESSMENT: 0-10

## 2025-05-07 ASSESSMENT — PAIN SCALES - GENERAL
PAIN_LEVEL: 0
PAINLEVEL_OUTOF10: 2
PAINLEVEL_OUTOF10: 2
PAINLEVEL_OUTOF10: 1
PAINLEVEL_OUTOF10: 0 - NO PAIN
PAINLEVEL_OUTOF10: 1

## 2025-05-07 ASSESSMENT — PAIN DESCRIPTION - DESCRIPTORS: DESCRIPTORS: CRAMPING

## 2025-05-07 NOTE — PROGRESS NOTES
05/07/25 1053   Discharge Planning   Home or Post Acute Services   (Blood pressure monitor)     Patient meets criteria for home monitoring of blood pressure post discharge.  Reason:  currently has 2 mild range blood pressures > 4 hours apart. Met with patient to assess for availability of home BP monitor.   Patient does not have access to BP monitor at home.  Pt agreed to order home BP monitor from [employer].   Large BP monitor delivered to room. Patient states she does not need instructions on how to use the BP monitor. Denies questions.Patient aware if insurance does not cover it, she will get a bill in the mail. Patient educated on importance of continuing to monitor BP at home, recording BP on home monitoring log and s/sx of when to call her provider.  Pt verbalized understanding the above information.

## 2025-05-07 NOTE — ANESTHESIA POSTPROCEDURE EVALUATION
Patient: Zayda Dotson    Procedure Summary       Date: 25 Room / Location:     Anesthesia Start: 7 Anesthesia Stop: 25 1316    Procedure: Labor Analgesia Diagnosis:     Scheduled Providers:  Responsible Provider: Paulino Olivera MD    Anesthesia Type: epidural ASA Status: 2            Anesthesia Type: epidural  Zayda Dotson is a 29 y.o., , who had a Vaginal, Spontaneous delivery on 2025 at 38w6d and is now POD1.    She had Neuraxial Anesthesia without immediate complications noted.       Pain well controlled    Vitals:    25 0418   BP: 123/82   Pulse: 79   Resp: 17   Temp: 36.1 °C (97 °F)   SpO2: 97%       Neuraxial site assessed. No visible redness or swelling or drainage. Patient able to ambulate and move all extremities without difficulty. Able to void. No complaints of nausea/vomiting. Tolerating PO intake well. No s/sx of PDPH.     Anesthesia will sign off     DEBBY Gregg-CRNA        Anesthesia Post Evaluation    Patient location during evaluation: floor  Patient participation: complete - patient participated  Level of consciousness: awake and alert  Pain score: 0  Pain management: adequate  Airway patency: patent  Cardiovascular status: acceptable  Respiratory status: acceptable  Hydration status: acceptable  Postoperative Nausea and Vomiting: none        There were no known notable events for this encounter.    
No

## 2025-05-07 NOTE — CARE PLAN
Problem: Postpartum  Goal: Experiences normal postpartum course  Outcome: Progressing     Problem: Postpartum  Goal: Establish and maintain infant feeding pattern for adequate nutrition  Outcome: Progressing     Problem: Postpartum  Goal: Appropriate maternal -  bonding  Outcome: Progressing     Problem: Postpartum  Goal: No s/sx of hemorrhage  Outcome: Progressing   The patient's goals for the shift include bonding with baby    The clinical goals for the shift include VSS, minimal bleeding

## 2025-05-07 NOTE — LACTATION NOTE
Lactation Consultant Note  Lactation Consultation  Reason for Consult: Initial assessment  Consultant Name: Shy Rea RN IBCLC    Maternal Information  Has mother  before?: No  Infant to breast within first 2 hours of birth?: Yes  Exclusive Pump and Bottle Feed: No    Maternal Assessment  Breast Assessment: Small, Symmetrical, Soft, Compressible  Nipple Assessment: Intact, Erect, Sore (mother reported bilat nipples sore, tip of L nipple sligtly reddened)  Areola Assessment: Normal    Infant Assessment  Infant Behavior: Feeding cues observed, Readiness to feed  Infant Assessment: Good cupping of tongue, Tongue protrudes over alveolar ridge    Feeding Assessment  Nutrition Source: Breastmilk  Feeding Method: Nursing at the breast  Feeding Position: Cross - cradle, Football/seated, C - hold, Breast sandwich, Mother needs assistance with latch/positioning  Suck/Feeding: Sustained, Tactile stimulation needed, Coordinated suck/swallow/breathe  Latch Assessment: Instructed on deep latch, Moderate assistance is needed, Bursts of sucking, swallowing, and rest, Wide open mouth < 160, Flanged lips, Areolar attachment, Sucks with long jaw movement    LATCH TOOL  Latch: Grasps breast, tongue down, lips flanged, rhythmic sucking  Audible Swallowing: Spontaneous and intermittent (24 hours old)  Type of Nipple: Everted (After stimulation)  Comfort (Breast/Nipple): Filling, red/small blisters/bruises, mild/moderate discomfort  Hold (Positioning): Minimal assist, teach one side, mother does other, staff holds  LATCH Score: 8    Breast Pump       Other OB Lactation Tools  Lactation Tools:  (mother has nipple cream and silverettes at bedside)    Patient Follow-up  Inpatient Lactation Follow-up Needed : Yes    Other OB Lactation Documentation  Maternal Risk Factors: Age over 30, primiparity, Hypertension  Infant Risk Factors: Early term birth 37-39 weeks    Recommendations/Summary  Mother reported infant has been breastfeeding  pretty well, however she is having some nipple soreness and stated that infant's latch feels a little pinchy. Mother had infant latched using a cradle hold at the R breast at time of consult. Infant's mouth open at an optimal angle and is not tucked in close to mother, latch appears shallow. I offered mother assistance with obtaining a deeper latch, mother receptive. I attempted to use my finger to gently lower infant's chin while bringing infant in closer to mother, infant's latch deepened briefly before infant's gape narrowed again causing mother pinching. I suggested we try a cross cradle hold and mother receptive. Mother unlatched infant and I performed a suck assessment on infant. I then assisted mother to position infant in a cross cradle hold at the R breast, and reviewed hand placement on infant and on breast, infant alignment at the breast and latching technique. I aligned infant's nose with mother's nipple and demonstrated bringing infant to the breast chin first with a wide open mouth. Infant latched readily, sucking with long jaw movements and some swallows noted, mother stating it felt more comfortable than her previous feedings, however still a little pinchy. With gentle adjustment to infant's chin the latch became more comfortable. Throughout feeding infant needed to be adjusted in order to maintain a deep latch without pinching for mother. We attempted to relatch infant a few times and I encouraged mother to use breast compression to aid infant in sustaining a deep comfortable latch which seemed to help. Mother's nipple appearing very slightly creased when infant came off the breast. I suggested we try football hold on the L breast in order for mother to learn different positions and mother receptive, her L nipple a little more so than her R. I assisted mother to use pillow support and position infant at the L breast in a football hold. With multiple attempts infant latched well, however infant's  mouth not open quite wide enough and latch was a little pinchy for mother. After gently adjusting infant's chin latch would be comfortable, however infant continued to adjust minimally adjust herself causing mother pinching. After a few attempts, utilizing breast compression, mother latched infant with minimal assistance and a deep latch was obtained with infant's mouth open at an optimal angle, infant sucked with long jaw movements and swallows noted. I reviewed characteristics and benefits of a deep and proper latch and the importance of maintaining a deep latch through out feeding. As feed progressed infant tired at the breast and infant's gape narrowed, causing mother discomfort again. I demonstrated breaking infant's latch, infant asleep and content next to mother.     I educated mother on early  infant feeding cues, feeding infant based on feeding cues every 2-3 hours, waking infant if it has been 3 hours since last feed, feeding infant on first breast until they unlatch or until tactile stimulation is not keeping them sucking/swallowing at breast. I then recommended burping infant and then trying to latch/feed infant on other breast. We also discussed tactile stimulation techniques and breast compression/massage to keep infant sucking and swallowing at the breast. I encouraged mother to breast feed skin to skin and to use pillow support while breastfeeding.     We discussed infant's weight and where she falls on the NEWT. I discussed the option of beginning pumping after some feedings to aid in the stimulation of her milk supply. Mother declined at this time and is aware that if infant is frequently showing feeding cues after breastfeeding or is not seeming content between feedings, mother can ask for assistance from lactation or bedside RN to be set up pumping at any time. I encouraged mother to utilize hand expression between feedings for added breast stimulation.    Mother has a breast pump for home  use and I reviewed outpatient lactation resources available to her. I encouraged her to call for any questions, concerns and assistance as needed.

## 2025-05-07 NOTE — PROGRESS NOTES
Postpartum Progress Note    Assessment/Plan   Zayda Dotson is a 29 y.o., , who delivered at 38w6d gestation    Now PPD#1 s/p Vaginal, Spontaneous on 2025  - Continue routine postpartum care  - Pain well controlled on po medications  - DVT risk score DVT Score (IF A SCORE IS NOT CALCULATING, MUST SELECT A BMI TO COMPLETE): 3 , ppx with SCDs  - RH negative, fetus A+, rhogam given    - Hgb:   Results from last 7 days   Lab Units 25  1845   HEMOGLOBIN g/dL 14.8        gHTN  -MRBPs >4 hr apart  -denied HA, vision changes, RUQ ABD pain  -CMP and CBC pending  -discussed with patient about recommendation to stay until postpartum day 3 for close BP monitoring    Anxiety  -on lexapro 20mg nightly  -reports mild anxiety    Maternal Well-Being  - Vitals stable  - All questions and concerns address    May Feeding  - Breastfeeding/pumping encouraged  - Lactation consult prn    Contraception  - condoms  - Education provided    Dispo  - Anticipate d/c on PPD #3 if meeting all postpartum milestones  - Follow-up in 2-5 days for BP check  - Follow-up in 4-6wks with primary OGYN    FREDDY Jara APRN-CNM      Assessment & Plan  Normal vaginal delivery ([facility])    Rh negative state in antepartum period ([facility])    Gestational hypertension ([facility])    Pregnancy Problems (from 10/03/24 to present)       Problem Noted Diagnosed Resolved    Gestational hypertension ([facility]) 2025 by FREDDY Jara  No    Priority:  Medium       Normal vaginal delivery ([facility]) 2025 by Melissa L Zahorsky, APRN-CNM  No    Priority:  Medium       Rh negative state in antepartum period ([facility]) 10/5/2024 by FREDDY Juarez  No    Priority:  Medium       Overview Addendum 3/18/2025  3:07 PM by FREDDY Juarez   [x] 28 week Rhogam         Not immune to rubella 10/5/2024 by FREDDY Juarez  No    Priority:  Medium       Overview Signed 10/5/2024   1:45 PM by FREDDY Juarez   Rubella equivocal  Will offer MMR postpartum         38 weeks gestation of pregnancy ([facility]) 10/3/2024 by FREDDY Juarez  No    Priority:  Medium       Overview Addendum 4/25/2025  9:15 AM by FREDDY Juarez   Delivery location: [facility]    Desired provider in labor: [x] CNM  [] Physician  [x] Blood Products: [x] Yes, accepts [] No, needs counseling  [x] Initial BMI: 20.99   [x] Prenatal Labs: wnl with exception of rubella equivocal status  [x] Cervical Cancer Screening up to date: next due 6/2026  [x] Rh status: Neg  [x] Genetic Screening: cffdna wnl, girl  [x] NT US: (11-13 wks) 0.8mm wnl  [x] Baby ASA (if indicated): not indicated  [x] Pregnancy dated by: LMP = 12w6d scan    [x] Anatomy US: (19-20 wks): wnl  [x] 1hr GCT at 24-28wks: passed  [x] Rhogam (if indicated): 3/4  [x] Tdap (27-32 wks, may be given up to 36 wks if initial window missed): 2/18  [x] Flu Vaccine: 10/3  [x] Birthtracks 1/21    [x] Breastfeeding:  [x] Postpartum Birth control method: cycle tracking / condoms  [x] GBS at 36 - 37 wks: neg  [x] 39 weeks discussion of IOL vs. Expectant management: Expectant until 41 weeks  [x] Mode of delivery ( anticipated ): Vaginal, desires NCB           Elevated blood pressure reading in office without diagnosis of hypertension 10/3/2024 by FREDDY Juarez  No    Priority:  Medium       Overview Addendum 10/5/2024  1:45 PM by FREDDY Juarez   /83 at NOB visit, 8w1d gestation  Baseline PEC labs wnl         Anxiety disorder affecting pregnancy, antepartum 10/3/2024 by FREDDY Juarez  No    Priority:  Medium       Overview Signed 10/3/2024  8:50 AM by FREDDY Juarez   Pt takes 20mg Lexapro daily  Referred to OBGYN Behavioral Health                 Subjective     Zayda JADYN Nila is PPD#1 s/p vaginal delivery who reports feeling overall well.  No acute events overnight.  Voiding spontaneously but reports feeling  like she is not fully emptying her bladder. Denies burning when she urinates. Passing flatus. She has been up walking around without issue. Pain well controlled on PO meds.  Scant lochia. Tolerating diet. Moodwise doing ok. Reports feeling tearful and frustrated once with the baby but otherwise feels her mood is stable. Reports she is feeling good about breastfeeding and working on baby getting deeper latch. Also utilizing lactation consultants for feeds when she needs to.    Denies headache/vision changes.     Objective   Allergies:   Patient has no known allergies.         Last Vitals:  Temp Pulse Resp BP MAP Pulse Ox   36.6 °C (97.9 °F) 81 16 136/84   97 %     Vitals Min/Max Last 24 Hours:  Temp  Min: 36 °C (96.8 °F)  Max: 37.5 °C (99.5 °F)  Pulse  Min: 70  Max: 131  Resp  Min: 16  Max: 20  BP  Min: 107/64  Max: 144/80    Physical Exam:  General: examination reveals a well developed, well nourished, female, in no acute distress. She is alert and cooperative.  Lungs: breathing even and unlabored, lungs clear  Cardiac: warm and well perfused, heart rate regular  Fundus: firm, midline, and below umbilicus, lochia scant.  Abdominal: soft, non-tender, non-distended, bowel sounds active  Extremities: no redness or tenderness in the calves or thighs, edema: not present  Neurological: alert, oriented, normal speech, no focal findings or movement disorder noted.  Psychological: awake and alert; oriented to person, place, and time.  Skin: no rashes or lesions    Lab Data:  Labs in chart were reviewed. CBC and CMP pending.       Alejandra Martin, DEBBY-DAVID

## 2025-05-07 NOTE — CARE PLAN
Problem: Postpartum  Goal: Experiences normal postpartum course  Outcome: Progressing  Goal: Establish and maintain infant feeding pattern for adequate nutrition  Outcome: Progressing  Goal: Appropriate maternal -  bonding  Outcome: Progressing  Goal: No s/sx of hemorrhage  Outcome: Progressing     The patient's goals for the shift include bonding with baby    The clinical goals for the shift include VSS, bleeding and swelling minimal, pain controlled with medications, and adequately feeding infant.    Over the shift the patient made progress to goals above. Patient maintained VS WNL, bleeding and swelling minimal, and pain controlled with medications. Patient is motivated to continue learning about breastfeeding and latching infant.

## 2025-05-08 PROCEDURE — 2500000001 HC RX 250 WO HCPCS SELF ADMINISTERED DRUGS (ALT 637 FOR MEDICARE OP): Performed by: NURSE PRACTITIONER

## 2025-05-08 PROCEDURE — 2500000004 HC RX 250 GENERAL PHARMACY W/ HCPCS (ALT 636 FOR OP/ED): Performed by: NURSE PRACTITIONER

## 2025-05-08 PROCEDURE — 1100000001 HC PRIVATE ROOM DAILY

## 2025-05-08 RX ADMIN — IBUPROFEN 600 MG: 600 TABLET ORAL at 14:10

## 2025-05-08 RX ADMIN — ESCITALOPRAM 20 MG: 20 TABLET, FILM COATED ORAL at 21:47

## 2025-05-08 RX ADMIN — IBUPROFEN 600 MG: 600 TABLET ORAL at 20:21

## 2025-05-08 RX ADMIN — ACETAMINOPHEN 975 MG: 325 TABLET, FILM COATED ORAL at 20:20

## 2025-05-08 RX ADMIN — ACETAMINOPHEN 975 MG: 325 TABLET, FILM COATED ORAL at 14:10

## 2025-05-08 RX ADMIN — IBUPROFEN 600 MG: 600 TABLET ORAL at 08:36

## 2025-05-08 RX ADMIN — POLYETHYLENE GLYCOL 3350 17 G: 17 POWDER, FOR SOLUTION ORAL at 14:10

## 2025-05-08 RX ADMIN — ACETAMINOPHEN 975 MG: 325 TABLET, FILM COATED ORAL at 08:32

## 2025-05-08 ASSESSMENT — PAIN DESCRIPTION - DESCRIPTORS: DESCRIPTORS: CRAMPING

## 2025-05-08 ASSESSMENT — PAIN SCALES - GENERAL
PAINLEVEL_OUTOF10: 1

## 2025-05-08 NOTE — PROGRESS NOTES
Postpartum Progress Note    Assessment/Plan   Zayda Dotson is a 29 y.o., , who delivered at 38w6d gestation and is now postpartum day 2.    Assessment & Plan  Normal vaginal delivery ([facility])    Rh negative state in antepartum period ([facility])    Gestational hypertension ([facility])    Pregnancy Problems (from 10/03/24 to present)       Problem Noted Diagnosed Resolved    Gestational hypertension ([facility]) 2025 by FREDDY Jara  No    Priority:  Medium       Normal vaginal delivery ([facility]) 2025 by Melissa L Zahorsky, APRN-CNM  No    Priority:  Medium       Rh negative state in antepartum period ([facility]) 10/5/2024 by FREDDY Juarez  No    Priority:  Medium       Overview Addendum 3/18/2025  3:07 PM by FREDDY Juarez   [x] 28 week Rhogam         Not immune to rubella 10/5/2024 by FREDDY Juarez  No    Priority:  Medium       Overview Signed 10/5/2024  1:45 PM by FREDDY Juarez   Rubella equivocal  Will offer MMR postpartum         38 weeks gestation of pregnancy ([facility]) 10/3/2024 by FREDDY Juarez  No    Priority:  Medium       Overview Addendum 2025  9:15 AM by FREDDY Juarez   [facility] [place] [facility]    Desired provider in labor: [x] CNM  [] Physician  [x] Blood Products: [x] Yes, accepts [] No, needs counseling  [x] Initial BMI: 20.99   [x] Prenatal Labs: wnl with exception of rubella equivocal status  [x] Cervical Cancer Screening up to date: next due 2026  [x] Rh status: Neg  [x] Genetic Screening: cffdna wnl, girl  [x] NT US: (11-13 wks) 0.8mm wnl  [x] Baby ASA (if indicated): not indicated  [x] Pregnancy dated by: LMP = 12w6d scan    [x] Anatomy US: (19-20 wks): wnl  [x] 1hr GCT at 24-28wks: passed  [x] Rhogam (if indicated): 3/4  [x] Tdap (27-32 wks, may be given up to 36 wks if initial window missed):   [x] Flu Vaccine: 10/3  [x] Birthtracks     [x] Breastfeeding:  [x] Postpartum Birth control  method: cycle tracking / condoms  [x] GBS at 36 - 37 wks: neg  [x] 39 weeks discussion of IOL vs. Expectant management: Expectant until 41 weeks  [x] Mode of delivery ( anticipated ): Vaginal, desires NCB           Elevated blood pressure reading in office without diagnosis of hypertension 10/3/2024 by FREDDY Juarez  No    Priority:  Medium       Overview Addendum 10/5/2024  1:45 PM by FREDDY Juarez   /83 at NOB visit, 8w1d gestation  Baseline PEC labs wnl         Anxiety disorder affecting pregnancy, antepartum 10/3/2024 by FREDDY Juarez  No    Priority:  Medium       Overview Signed 10/3/2024  8:50 AM by FREDDY Juarez   Pt takes 20mg Lexapro daily  [facility]               Anticipate discharge on PP day #3 for [facility] course: gestational hypertension  Vaginal Birth  Patient is currently breastfeedingThe patient's blood type is O NEG. The baby's blood type is A POS. Rhogam indicated and given.    Subjective   Her pain is well controlled with current medications  She is passing flatus  She is ambulating well  She is tolerating a Adult diet Regular  She reports no breast or nursing problems  She denies emotional concerns today   Her plan for contraception is condoms    Objective   Allergies:   Patient has no known allergies.         Last Vitals:  Temp Pulse Resp BP MAP Pulse Ox   36.4 °C (97.5 °F) 70 18 129/81 97 95 %     Vitals Min/Max Last 24 Hours:  Temp  Min: 36.2 °C (97.2 °F)  Max: 36.8 °C (98.2 °F)  Pulse  Min: 69  Max: 81  Resp  Min: 16  Max: 20  BP  Min: 113/69  Max: 136/84  MAP (mmHg)  Min: 84  Max: 102    Intake/Output:   No intake or output data in the 24 hours ending 05/08/25 1014    Physical Exam:  General: Examination reveals a well developed, well nourished, female, in no acute distress. She is alert and cooperative.  Lungs: clear to auscultation bilaterally.  Abdomen: soft, gravid, nontender, nondistended, no  abnormal masses, no epigastric pain.  Fundus: firm and below umbilicus.  Psychological: awake and alert; oriented to person, place, and time.    Chaperone Present: Declined.    Lab Data:  Labs in chart were reviewed.

## 2025-05-08 NOTE — LACTATION NOTE
Lactation Consultant Note  Lactation Consultation  Reason for Consult: Follow-up assessment  Consultant Name: Katarina Crowder, RN, IBCLC    Maternal Information  Has mother  before?: No  Infant to breast within first 2 hours of birth?: Yes  Exclusive Pump and Bottle Feed: No    Maternal Assessment  Breast Assessment: Small, Symmetrical, Compressible, Soft  Nipple Assessment: Intact, Erect, Scabbed, Red/inflamed, Distorted shape or flattened, Large diameter, Creased after feeding  Areola Assessment: Normal    Infant Assessment  Infant Behavior: Quiet alert, Crying, Readiness to feed, Feeding cues observed, Rooting response, Sucking  Infant Assessment: Jaundice, Good lateral movement of tongue, Good cupping of tongue, Palate - high/arch/bubble/normal, Able to elevate tongue to roof of mouth, Tongue protrudes over alveolar ridge    Feeding Assessment  Nutrition Source: Breastmilk  Feeding Method: Nursing at the breast  Feeding Position: Breast sandwich, C - hold, Skin to skin, Infant not tucked close and facing mother, Cross - cradle, One side per feeding, Mother needs assistance with latch/positioning, Nose lightly touching breast  Suck/Feeding: Sustained, Baby led rhythmically, Audible swallowing, Coordinated suck/swallow/breathe  Latch Assessment: Instructed on deep latch, Pain during feeding, Flanged lips, Eagerly grasped on to latch, Mouth open/moves head side to side, Sucking and swallowing, Chin moves in rhythmic motion, Areolar attachment, Chin and lower lip contact breast first, Shallow latch, Moderate assistance is needed, Latch achieved, Latch is painful    LATCH TOOL  Latch: Grasps breast, tongue down, lips flanged, rhythmic sucking  Audible Swallowing: Spontaneous and intermittent (24 hours old)  Type of Nipple: Everted (After stimulation)  Comfort (Breast/Nipple): Filling, red/small blisters/bruises, mild/moderate discomfort  Hold (Positioning): Minimal assist, teach one side, mother does other,  staff holds  LATCH Score: 8    Breast Pump       Other OB Lactation Tools  Lactation Tools: Comfort gels (silverettes at bedside. LC placed comfort gels in fridge.)    Patient Follow-up  Inpatient Lactation Follow-up Needed : Yes  Outpatient Lactation Follow-up: Recommended    Other OB Lactation Documentation  Maternal Risk Factors: Age over 30, primiparity, Hypertension  Infant Risk Factors: Early term birth 37-39 weeks    Recommendations/Summary  LC in room to observe a latch. Baby was latched in a cross-cradle hold, swaddled, close to mom. Mom shared some concerns with LC as baby cluster-fed and did not sleep much throughout the night. LC provided reassurance and explained to mom and dad that clusterfeeding is normal and reviewed expected  feeding behaviors in the first few days. Baby did fall asleep this morning per parents. Baby has peed and poopd in life, jaundice levels are WNL and at 41 HOL baby is down 6.2% in weight. LC plans to consult with peds. Upon observation LC noticed that baby was latched on very shallow - LC had dad observe. When mom took baby off of breast her nipple was creased and pinched. Her left nipple is red, inflamed and has some scabbing. Mom shares that latching has been pretty painful, most likely due to shallow latching. Mom receptive to allow LC make some changes. LC stripped baby down and placed her STS with mom. Baby was awake, crying, showing hunger cues and suckling. LC placed baby back in a cross-cradle position and demonstrated how to obtain a deep latch, explained deep latch characteristics to mom and dad. LC was able to achieve a deep latch right away, mom states that this latch felt a lot better but was still a little sore but most likely from previous trauma.  LC suggested using either her silverettes or cool comfort gels in between latching to assist with nipple healing.   LC is not too concerned about baby right now and would prefer mom work on obtaining a deeper  latch before adding a pump to her regimen, however, will consult with peds. LC will also know more after baby is weighed around 1300 today (48 HOL).   LC provided reassurance to parents and encouraged them to call with any questions, assistance or concerns.   Mom does have a hand held pump, haaka, and wearable pump at home. LC suggested trying to obtain a spectra or wall pump if needing to pump in the future to help build supply. LC will measure mom's nipples so she knows what flange size to use.

## 2025-05-09 ENCOUNTER — PHARMACY VISIT (OUTPATIENT)
Dept: PHARMACY | Facility: CLINIC | Age: 30
End: 2025-05-09
Payer: COMMERCIAL

## 2025-05-09 VITALS
HEART RATE: 79 BPM | RESPIRATION RATE: 16 BRPM | WEIGHT: 156.53 LBS | BODY MASS INDEX: 25.16 KG/M2 | OXYGEN SATURATION: 96 % | TEMPERATURE: 98.4 F | HEIGHT: 66 IN | DIASTOLIC BLOOD PRESSURE: 81 MMHG | SYSTOLIC BLOOD PRESSURE: 126 MMHG

## 2025-05-09 PROCEDURE — 90707 MMR VACCINE SC: CPT | Mod: JZ | Performed by: NURSE PRACTITIONER

## 2025-05-09 PROCEDURE — 90471 IMMUNIZATION ADMIN: CPT | Performed by: NURSE PRACTITIONER

## 2025-05-09 PROCEDURE — 99239 HOSP IP/OBS DSCHRG MGMT >30: CPT | Performed by: ADVANCED PRACTICE MIDWIFE

## 2025-05-09 PROCEDURE — 2500000004 HC RX 250 GENERAL PHARMACY W/ HCPCS (ALT 636 FOR OP/ED): Mod: JZ | Performed by: NURSE PRACTITIONER

## 2025-05-09 PROCEDURE — RXMED WILLOW AMBULATORY MEDICATION CHARGE

## 2025-05-09 PROCEDURE — 2500000001 HC RX 250 WO HCPCS SELF ADMINISTERED DRUGS (ALT 637 FOR MEDICARE OP): Performed by: NURSE PRACTITIONER

## 2025-05-09 RX ORDER — DOCUSATE SODIUM 100 MG/1
100 CAPSULE, LIQUID FILLED ORAL 2 TIMES DAILY PRN
Qty: 30 CAPSULE | Refills: 5 | Status: SHIPPED | OUTPATIENT
Start: 2025-05-09

## 2025-05-09 RX ORDER — ACETAMINOPHEN 325 MG/1
650 TABLET ORAL EVERY 6 HOURS PRN
Qty: 90 TABLET | Refills: 0 | Status: SHIPPED | OUTPATIENT
Start: 2025-05-09

## 2025-05-09 RX ORDER — IBUPROFEN 600 MG/1
600 TABLET ORAL EVERY 6 HOURS PRN
Qty: 30 TABLET | Refills: 3 | Status: SHIPPED | OUTPATIENT
Start: 2025-05-09 | End: 2025-05-09

## 2025-05-09 RX ORDER — ACETAMINOPHEN 325 MG/1
650 TABLET ORAL EVERY 6 HOURS PRN
Qty: 30 TABLET | Refills: 3 | Status: SHIPPED | OUTPATIENT
Start: 2025-05-09 | End: 2025-05-09

## 2025-05-09 RX ORDER — IBUPROFEN 600 MG/1
600 TABLET ORAL EVERY 6 HOURS PRN
Qty: 90 TABLET | Refills: 0 | Status: SHIPPED | OUTPATIENT
Start: 2025-05-09

## 2025-05-09 RX ADMIN — ACETAMINOPHEN 975 MG: 325 TABLET, FILM COATED ORAL at 08:29

## 2025-05-09 RX ADMIN — ACETAMINOPHEN 975 MG: 325 TABLET, FILM COATED ORAL at 14:54

## 2025-05-09 RX ADMIN — IBUPROFEN 600 MG: 600 TABLET ORAL at 14:55

## 2025-05-09 RX ADMIN — ACETAMINOPHEN 975 MG: 325 TABLET, FILM COATED ORAL at 02:45

## 2025-05-09 RX ADMIN — IBUPROFEN 600 MG: 600 TABLET ORAL at 08:29

## 2025-05-09 RX ADMIN — IBUPROFEN 600 MG: 600 TABLET ORAL at 02:45

## 2025-05-09 RX ADMIN — MEASLES, MUMPS, AND RUBELLA VIRUS VACCINE LIVE 0.5 ML: 1000; 12500; 1000 INJECTION, POWDER, LYOPHILIZED, FOR SUSPENSION SUBCUTANEOUS at 15:56

## 2025-05-09 RX ADMIN — MAGNESIUM HYDROXIDE 10 ML: 400 SUSPENSION ORAL at 01:04

## 2025-05-09 ASSESSMENT — PAIN SCALES - GENERAL
PAINLEVEL_OUTOF10: 0 - NO PAIN
PAINLEVEL_OUTOF10: 1

## 2025-05-09 ASSESSMENT — PAIN - FUNCTIONAL ASSESSMENT: PAIN_FUNCTIONAL_ASSESSMENT: 0-10

## 2025-05-09 ASSESSMENT — PAIN DESCRIPTION - DESCRIPTORS: DESCRIPTORS: CRAMPING

## 2025-05-09 NOTE — CARE PLAN
The patient's goals for the shift include shower  Patient with stable vital signs and assessment. Pain well controlled with scheduled medications.  Problem: Postpartum  Goal: Experiences normal postpartum course  Outcome: Progressing

## 2025-05-09 NOTE — DISCHARGE SUMMARY
Discharge Summary    Zayda Dotson is a 29 y.o. year old female , who delivered at 38w6d gestation via Vaginal, Spontaneous, now PPD#3.    Admission Date: 2025  Discharge Date: 25     Discharge Diagnosis  Vaginal, Spontaneous    Hospital Course  Delivery Date: 2025 1:16 PM  Delivery type: Vaginal, Spontaneous   GA at delivery: 38w6d   Outcome: Living  Intrapartum complications: gestational hypertension   Feeding method: Breastfeeding Status: Yes     Contraception: condoms  We discussed pregnancy spacing of at least one year, abstaining from intercourse for 6wks, and the ability to become pregnant in the absence of regular menses. Pt verbalized understanding.    Patient doing well and feels ready for discharge (to boarding because baby is under lights). Bleeding appropriate. Pain well controlled overall. Patient denies any problems urinating or passing gas. She has been up walking around without issue. Breastfeeding going well overall, having some left nipple pain. Applying nipple cream and breastmilk after feeds. Lactation has been in to see patient and will continue to see her. Patient sleeping ok. Moodwise doing ok.      Pertinent Physical Exam At Time of Discharge  General: Examination reveals a well developed, well nourished, female, in no acute distress. She is alert and cooperative.  Lungs: clear to auscultation bilaterally.  Breast: left nipple scabbed, red/inflamed. Right nipple intact.   Abdomen: soft, gravid, nontender, nondistended, no abnormal masses, no epigastric pain.  Fundus: firm and below umbilicus, no bleeding.   Psychological: awake and alert; oriented to person, place, and time.    Pertinent Subjective at Time of Discharge  Zayda Dotson is a 29 y.o. year old female , who delivered at 38w6d gestation via Vaginal, Spontaneous, now PPD#3, who reports feeling overall well.  No acute events overnight.  Voiding spontaneously, passing flatus.  Pain well controlled on PO  "meds.  Light lochia. Tolerating diet. Denies headache/vision changes.     Vitals  /81   Pulse 79   Temp 36.9 °C (98.4 °F)   Resp 16   Ht 1.676 m (5' 6\")   Wt 71 kg (156 lb 8.4 oz)   LMP 08/07/2024 Comment: last pap 6/2023 PCP  SpO2 96%   Breastfeeding Yes   BMI 25.26 kg/m²      Discharge Meds     Your medication list        START taking these medications        Instructions Last Dose Given Next Dose Due   acetaminophen 325 mg tablet  Commonly known as: Tylenol      Take 2 tablets (650 mg) by mouth every 6 hours if needed for mild pain (1 - 3), moderate pain (4 - 6), headaches or fever (temp greater than 38.0 C).       docusate sodium 100 mg capsule  Commonly known as: Colace      Take 1 capsule (100 mg) by mouth 2 times a day as needed for constipation.       ibuprofen 600 mg tablet      Take 1 tablet (600 mg) by mouth every 6 hours if needed for mild pain (1 - 3), moderate pain (4 - 6), fever (temp greater than 38.0 C) or headaches.              CONTINUE taking these medications        Instructions Last Dose Given Next Dose Due   escitalopram 20 mg tablet  Commonly known as: Lexapro      Take 1 tablet (20 mg) by mouth once daily.       PRENATAL 19 ORAL                     Where to Get Your Medications        These medications were sent to [facility]  [address], [address], [place] [address]      Hours: 8AM to 6PM Mon-Fri, 8AM to 4PM Sat, 9AM to 1PM Sun Phone: [phone]   acetaminophen 325 mg tablet  docusate sodium 100 mg capsule  ibuprofen 600 mg tablet          Complications Requiring Follow-Up  Gestational HTN    Continues to meet all postpartum milestones.  Safe and stable for d/c home. Return precautions given.        Outpatient Follow-Up  Follow-up in 2-5 day for BP check and 4-6wk with CNM at [facility].      I spent 30+  minutes in the professional and overall care of this patient.      DEBBY Jara-FREDDY Gaytan    "

## 2025-05-09 NOTE — LACTATION NOTE
Lactation Consultant Note  Lactation Consultation  Reason for Consult: Follow-up assessment  Consultant Name: Liz Gray RN, IBCLC    Maternal Information  Has mother  before?: No  Infant to breast within first 2 hours of birth?: Yes  Exclusive Pump and Bottle Feed: No    Maternal Assessment  Breast Assessment: Medium, Symmetrical, Filling  Nipple Assessment: Erect (scab on left nipple)  Alterations in Nipple Condition: Stage II - abrasions, shallow crack/fissure, stripe  Areola Assessment: Normal    Infant Assessment  Infant Behavior: Active alert, Readiness to feed, Feeding cues observed, Rooting response    Feeding Assessment  Nutrition Source: Breastmilk, Donor human milk  Feeding Method: Nursing at the breast, Feeding expressed breastmilk, Paced bottle  Feeding Position: Breast sandwich, Skin to skin, Nipple to nose, Football/seated, Cross - cradle  Suck/Feeding: Sustained, Tactile stimulation needed, Audible swallowing, Coordinated suck/swallow/breathe  Latch Assessment: Minimal assistance is needed, Eagerly grasped on to latch, Areolar attachment, Deep latch obtained, Sucking and swallowing, Bursts of sucking, swallowing, and rest, Frequent audible swallows    LATCH TOOL  Latch: Grasps breast, tongue down, lips flanged, rhythmic sucking  Audible Swallowing: Spontaneous and intermittent (24 hours old)  Type of Nipple: Everted (After stimulation)  Comfort (Breast/Nipple): Soft/non-tender  Hold (Positioning): Minimal assist, teach one side, mother does other, staff holds  LATCH Score: 9    Breast Pump  Pump: Hospital grade electric pump  Frequency:  (initiated at time of consult, plan to pump after feeds and feed baby extra while she is recieving phototherapy)  Duration: Initiate phase  Breast Shield Size and Type: 24 mm  Volume of Milk Production: 7  Units of Volume: mL per session    Other OB Lactation Tools  Lactation Tools: Flanges, Other (Comment) (nipple balm and silverettes at  bedside)    Patient Follow-up  Inpatient Lactation Follow-up Needed : Yes  Outpatient Lactation Follow-up: Recommended    Other OB Lactation Documentation  Maternal Risk Factors: Age over 30, primiparity, Hypertension  Infant Risk Factors: Early term birth 37-39 weeks, Other (comment)  Additional Problem Noted: infant recieving phototherapy    Recommendations/Summary  Worked with parents to help get baby fed and placed back into phototherapy. Upon entering, infant was fussy in the basinet on a bili bed, under the bili light. Mom was agreeable to latching baby on at time of assessment. Baby was well aligned, skin to skin in football hold.  Ear-shoulder-hip in line and baby's belly in towards mom.  With baby's arms on either side of mom's breast, showed mom how to sandwich her areola, tap her nipple under baby's nose and wait for baby's mouth to open before pulling baby in for a deep latch. Baby latched on eagerly. Deep latch, areolar attatchment, lips flanged, nose and chin touching the breast. Baby sucked rhythmically slowing down periodically and needing tactile stimulation to continue, having frequent audible swallowing.    Tried to finish feed and get baby under the lights after 30mins of feeding and again after 60mins of feeding. Baby was not able to settle. Mom was then agreeable to pumping her left (sore, scabbed) side, while baby continued feeding on her right side. She pumped out 7ml and dad fed that to baby while we got baby back under phototherapy. After that, parents worked with baby to get her settled, but baby would not settle. I then recommended feeding baby with donor milk, giving the option to use SNS or a bottle, parent's opted for the bottle at that time.   Infant took 28ml of donor milk under the lights, parent's taught paced bottle feeding.  After infant took donor milk, she was still active under the light, but no longer crying. Parents continue to work on soothing infant under light, praise and  encouragement given.    Recommend mom to pump once baby is calmed, then to resume breastfeeding for next feed (in 2-3hrs). Plan to supplement with pumped milk from this upcoming session after next breastfeed, and to call out for supplemental donor milk in addition as needed to keep baby comfortable and calm while receiving phototherapy.  Discussed cluster feeding and what to expect in the coming days with infant behavior and feeds. Anticipate baby being easier to settle in bassinet once phototherapy is discontinued. Call for assistance throughout stay, and follow up with outpatient lactation as needed after discharge.

## 2025-05-09 NOTE — PROGRESS NOTES
Zayda Dotson is a 29 y.o. female on day 4 of admission presenting with Normal vaginal delivery ([facility]).    SW auto consult for risk screen (housing concerns). RN checked in with pt who verified this was a miss click. No concerns at this time. Please re-consult SW should new concerns arise.    Social Work will continue to remain available for any questions or concerns. Please feel free to reach out.     5/9/2025   AGUSTIN Charles  OB/GYN   Office Phone: [phone]  Secure chat preferred

## 2025-05-10 ENCOUNTER — LACTATION ENCOUNTER (OUTPATIENT)
Dept: NURSERY | Facility: HOSPITAL | Age: 30
End: 2025-05-10

## 2025-05-10 NOTE — LACTATION NOTE
This note was copied from a baby's chart.  Lactation Consultant Note  Lactation Consultation       Maternal Information       Maternal Assessment       Infant Assessment       Feeding Assessment       LATCH TOOL       Breast Pump       Other OB Lactation Tools       Patient Follow-up       Other OB Lactation Documentation       Recommendations/Summary  \Quick check in with mother. She shared that feedings have improved and that she was very happy with all the lactation guidance and assistance she had throughout her stay. She denied having any questions nor concerns at this time. Mother is aware of the outpatient lactation centers for lactation issues.

## 2025-05-13 ENCOUNTER — APPOINTMENT (OUTPATIENT)
Dept: OBSTETRICS AND GYNECOLOGY | Facility: CLINIC | Age: 30
End: 2025-05-13
Payer: COMMERCIAL

## 2025-05-15 ENCOUNTER — APPOINTMENT (OUTPATIENT)
Dept: OBSTETRICS AND GYNECOLOGY | Facility: CLINIC | Age: 30
End: 2025-05-15
Payer: COMMERCIAL

## 2025-05-15 VITALS
WEIGHT: 142 LBS | HEIGHT: 66 IN | SYSTOLIC BLOOD PRESSURE: 130 MMHG | DIASTOLIC BLOOD PRESSURE: 80 MMHG | BODY MASS INDEX: 22.82 KG/M2

## 2025-05-15 PROBLEM — Z78.9 NOT IMMUNE TO RUBELLA: Status: RESOLVED | Noted: 2024-10-05 | Resolved: 2025-05-15

## 2025-05-15 PROBLEM — O99.340 ANXIETY DISORDER AFFECTING PREGNANCY, ANTEPARTUM: Status: RESOLVED | Noted: 2024-10-03 | Resolved: 2025-05-15

## 2025-05-15 PROBLEM — R03.0 ELEVATED BLOOD PRESSURE READING IN OFFICE WITHOUT DIAGNOSIS OF HYPERTENSION: Status: RESOLVED | Noted: 2024-10-03 | Resolved: 2025-05-15

## 2025-05-15 PROBLEM — Z67.91 RH NEGATIVE STATE IN ANTEPARTUM PERIOD (HHS-HCC): Status: RESOLVED | Noted: 2024-10-05 | Resolved: 2025-05-15

## 2025-05-15 PROBLEM — Z3A.38 38 WEEKS GESTATION OF PREGNANCY (HHS-HCC): Status: RESOLVED | Noted: 2024-10-03 | Resolved: 2025-05-15

## 2025-05-15 PROBLEM — O13.9 GESTATIONAL HYPERTENSION (HHS-HCC): Status: ACTIVE | Noted: 2025-05-15

## 2025-05-15 PROBLEM — O26.899 RH NEGATIVE STATE IN ANTEPARTUM PERIOD (HHS-HCC): Status: RESOLVED | Noted: 2024-10-05 | Resolved: 2025-05-15

## 2025-05-15 PROBLEM — O13.9 GESTATIONAL HYPERTENSION (HHS-HCC): Status: RESOLVED | Noted: 2025-05-07 | Resolved: 2025-05-15

## 2025-05-15 PROBLEM — F41.9 ANXIETY DISORDER AFFECTING PREGNANCY, ANTEPARTUM: Status: RESOLVED | Noted: 2024-10-03 | Resolved: 2025-05-15

## 2025-05-15 ASSESSMENT — EDINBURGH POSTNATAL DEPRESSION SCALE (EPDS)
I HAVE FELT SAD OR MISERABLE: NO, NOT AT ALL
THE THOUGHT OF HARMING MYSELF HAS OCCURRED TO ME: NEVER
I HAVE BEEN SO UNHAPPY THAT I HAVE BEEN CRYING: NO, NEVER
I HAVE BEEN SO UNHAPPY THAT I HAVE HAD DIFFICULTY SLEEPING: NOT AT ALL
I HAVE FELT SCARED OR PANICKY FOR NO GOOD REASON: NO, NOT AT ALL
THINGS HAVE BEEN GETTING ON TOP OF ME: NO, I HAVE BEEN COPING AS WELL AS EVER
I HAVE LOOKED FORWARD WITH ENJOYMENT TO THINGS: AS MUCH AS I EVER DID
I HAVE BEEN ANXIOUS OR WORRIED FOR NO GOOD REASON: YES, SOMETIMES
I HAVE BEEN ABLE TO LAUGH AND SEE THE FUNNY SIDE OF THINGS: AS MUCH AS I ALWAYS COULD
I HAVE BLAMED MYSELF UNNECESSARILY WHEN THINGS WENT WRONG: NOT VERY OFTEN
TOTAL SCORE: 3

## 2025-05-15 ASSESSMENT — PAIN SCALES - GENERAL: PAINLEVEL_OUTOF10: 0-NO PAIN

## 2025-05-15 NOTE — PROGRESS NOTES
Subjective   29 y.o.  presenting for postpartum follow-up     Delivery Date: 2025  Gestational Age: 38w6d   Type of Delivery: Vaginal, Spontaneous        Great birth experience  Small vaginal lac w/ repair    Diagnosed PP with GHTN immediately PP-Bps at home normotensive, isolated diastolic of 92    No meds  Feeling great  Baby had to stay for bili lights    Breastfeeding is going great    Pregnancy Problems (from 10/03/24 to present)       Problem Noted Diagnosed Resolved    Normal vaginal delivery ([facility]) 2025 by Melissa L Zahorsky, APRN-CNM  No    Priority:  Medium       Rh negative state in antepartum period ([facility]) 10/5/2024 by FREDDY Juarez  No    Priority:  Medium       Overview Addendum 3/18/2025  3:07 PM by FREDDY Juarez   [x] 28 week Rhogam         Not immune to rubella 10/5/2024 by FREDDY Juarez  No    Priority:  Medium       Overview Signed 10/5/2024  1:45 PM by FREDDY Juarez   Rubella equivocal  Will offer MMR postpartum         38 weeks gestation of pregnancy ([facility]) 10/3/2024 by FREDDY Juarez  No    Priority:  Medium       Overview Addendum 2025  9:15 AM by FREDDY Juarez   Delivery location: [facility]    Desired provider in labor: [x] CNM  [] Physician  [x] Blood Products: [x] Yes, accepts [] No, needs counseling  [x] Initial BMI: 20.99   [x] Prenatal Labs: wnl with exception of rubella equivocal status  [x] Cervical Cancer Screening up to date: next due 2026  [x] Rh status: Neg  [x] Genetic Screening: cffdna wnl, girl  [x] NT US: (11-13 wks) 0.8mm wnl  [x] Baby ASA (if indicated): not indicated  [x] Pregnancy dated by: LMP = 12w6d scan    [x] Anatomy US: (19-20 wks): wnl  [x] 1hr GCT at 24-28wks: passed  [x] Rhogam (if indicated): 3/4  [x] Tdap (27-32 wks, may be given up to 36 wks if initial window missed):   [x] Flu Vaccine: 10/3  [x] Birthtracks     [x] Breastfeeding:  [x] Postpartum Birth  "control method: cycle tracking / condoms  [x] GBS at 36 - 37 wks: neg  [x] 39 weeks discussion of IOL vs. Expectant management: Expectant until 41 weeks  [x] Mode of delivery ( anticipated ): Vaginal, desires NCB           Elevated blood pressure reading in office without diagnosis of hypertension 10/3/2024 by FREDDY Juarez  No    Priority:  Medium       Overview Addendum 10/5/2024  1:45 PM by FREDDY Juarez   /83 at NOB visit, 8w1d gestation  Baseline PEC labs wnl         Anxiety disorder affecting pregnancy, antepartum 10/3/2024 by FREDDY Juarez  No    Priority:  Medium       Overview Signed 10/3/2024  8:50 AM by FREDDY Juarez   Pt takes 20mg Lexapro daily  Referred to OBGYN Behavioral Health         Gestational hypertension ([facility]) 2025 by FREDDY Jara  No            Concerns: None     Pain: controlled  Lacerations: vaginal  Lochia: light   Sexual Intimacy: No  Contraceptive Method: will discuss NV   Feeding Method: She is breast feeding exclusively. no breast or nursing problems  Lactation Consult Needed?: No    Birth Trauma: No  Bonding with Baby: well with baby girl, Moreno    Mood:   Postpartum Depression: Low Risk  (2025)    [place]  Depression Scale     Last EPDS Total Score: 2     Last EPDS Self Harm Result: Never     Last pap: ->Due      Objective    /80   Ht 1.676 m (5' 6\")   Wt 64.4 kg (142 lb)   LMP 2024 Comment: last pap 2023 PCP  Breastfeeding Yes   BMI 22.92 kg/m²    Physical Exam  Constitutional:       Appearance: Normal appearance. She is normal weight.   Genitourinary:      Genitourinary Comments: Currently nursing  Nipples intact   No pain    Breasts:     Right: Normal.      Left: Normal.   Neurological:      Mental Status: She is alert.   Psychiatric:         Mood and Affect: Mood normal.         Behavior: Behavior normal.         Thought Content: Thought content normal.         " Judgment: Judgment normal.          Plan    Advised to call office for breast complaints, abnormal bleeding,  Problem List Items Addressed This Visit           ICD-10-CM       Medium    Gestational hypertension without significant proteinuria, postpartum ([facility]) - Primary O13.5    Bps in range  Discussed symptoms to monitor for          Postpartum exam ([facility]) Z39.2    RTO 4 weeks            Melisa Win, DEBBY-CNM

## 2025-05-28 ENCOUNTER — TELEPHONE (OUTPATIENT)
Dept: LACTATION | Facility: CLINIC | Age: 30
End: 2025-05-28
Payer: COMMERCIAL

## 2025-05-28 NOTE — LACTATION NOTE
Zayda left a message for outpatient lactation inquiring about the Mommy and Baby Too support group that meets at [place]. Left a voicemail informing Zayda that the group meets every Thursday from 11 am- 12 pm at [address]. Instructed Zayda to call back with any questions- [phone].

## 2025-06-12 ENCOUNTER — APPOINTMENT (OUTPATIENT)
Dept: OBSTETRICS AND GYNECOLOGY | Facility: CLINIC | Age: 30
End: 2025-06-12
Payer: COMMERCIAL

## 2025-06-12 VITALS
DIASTOLIC BLOOD PRESSURE: 62 MMHG | HEIGHT: 66 IN | SYSTOLIC BLOOD PRESSURE: 118 MMHG | WEIGHT: 143 LBS | BODY MASS INDEX: 22.98 KG/M2

## 2025-06-12 PROCEDURE — 0503F POSTPARTUM CARE VISIT: CPT | Performed by: ADVANCED PRACTICE MIDWIFE

## 2025-06-12 ASSESSMENT — PAIN SCALES - GENERAL: PAINLEVEL_OUTOF10: 0-NO PAIN

## 2025-06-12 NOTE — PROGRESS NOTES
"Subjective   29 y.o.  presenting for postpartum follow-up     Delivery Date: 2025  Gestational Age: 38.6   Type of Delivery:      Denies issues with bowel or bladder     Diagnosed PP with GHTN immediately PP-BPs at home normotensive, isolated diastolic of 92  Not checking really much at all anymore, were normal       Feeling great, breastfeeding is going great-went to group today  Some struggle with clogged ducts     Problem List       No episode was linked to this visit.            Concerns: none     Pain: controlled  Lacerations: vaginal  Lochia: gone   Sexual Intimacy: No  Contraceptive Method: Condoms  Feeding Method: She is breast feeding exclusively. no breast or nursing problems  Lactation Consult Needed?: No    Birth Trauma: No  Bonding with Baby: well with baby girl, Moreno  Mood:   Postpartum Depression: Low Risk  (5/15/2025)    [place]  Depression Scale     Last EPDS Total Score: 3     Last EPDS Self Harm Result: Never     Last pap: due      Objective    /62   Ht 1.676 m (5' 6\")   Wt 64.9 kg (143 lb)   Breastfeeding Yes   BMI 23.08 kg/m²    Physical Exam  Constitutional:       Appearance: Normal appearance. She is normal weight.   Genitourinary:      Genitourinary Comments: Clogged duct     Sutures dissolved    Cardiovascular:      Rate and Rhythm: Normal rate and regular rhythm.   Pulmonary:      Effort: Pulmonary effort is normal.      Breath sounds: Normal breath sounds.   Chest:       Neurological:      Mental Status: She is alert.   Skin:     General: Skin is warm and dry.   Psychiatric:         Mood and Affect: Mood normal.         Behavior: Behavior normal.         Thought Content: Thought content normal.         Judgment: Judgment normal.          Plan    Advised to call office for breast complaints, abnormal bleeding, mood changes, or other concerning symptoms.   Cleared to resume normal activity as desired      Problem List Items Addressed This Visit           " ICD-10-CM       Medium    Postpartum exam ([facility]) - Primary Z39.2    RTO for APE or prn     Pap due 1 year             Melisa Win, APRN-CNM

## 2025-07-02 ENCOUNTER — APPOINTMENT (OUTPATIENT)
Dept: PRIMARY CARE | Facility: CLINIC | Age: 30
End: 2025-07-02
Payer: COMMERCIAL

## 2025-07-02 VITALS
WEIGHT: 143 LBS | HEART RATE: 78 BPM | SYSTOLIC BLOOD PRESSURE: 122 MMHG | RESPIRATION RATE: 16 BRPM | DIASTOLIC BLOOD PRESSURE: 80 MMHG | TEMPERATURE: 97.8 F | HEIGHT: 66 IN | OXYGEN SATURATION: 98 % | BODY MASS INDEX: 22.98 KG/M2

## 2025-07-02 DIAGNOSIS — Z00.00 HEALTHCARE MAINTENANCE: Primary | ICD-10-CM

## 2025-07-02 PROCEDURE — 1036F TOBACCO NON-USER: CPT | Performed by: FAMILY MEDICINE

## 2025-07-02 PROCEDURE — 99213 OFFICE O/P EST LOW 20 MIN: CPT | Performed by: FAMILY MEDICINE

## 2025-07-02 PROCEDURE — 3008F BODY MASS INDEX DOCD: CPT | Performed by: FAMILY MEDICINE

## 2025-07-02 PROCEDURE — 99395 PREV VISIT EST AGE 18-39: CPT | Performed by: FAMILY MEDICINE

## 2025-07-02 RX ORDER — SERTRALINE HYDROCHLORIDE 25 MG/1
25 TABLET, FILM COATED ORAL DAILY
Qty: 14 TABLET | Refills: 0 | Status: SHIPPED | OUTPATIENT
Start: 2025-07-02 | End: 2025-07-16

## 2025-07-02 RX ORDER — SERTRALINE HYDROCHLORIDE 50 MG/1
50 TABLET, FILM COATED ORAL DAILY
Qty: 30 TABLET | Refills: 2 | Status: SHIPPED | OUTPATIENT
Start: 2025-07-02 | End: 2025-08-31

## 2025-07-02 ASSESSMENT — PATIENT HEALTH QUESTIONNAIRE - PHQ9
2. FEELING DOWN, DEPRESSED OR HOPELESS: NOT AT ALL
1. LITTLE INTEREST OR PLEASURE IN DOING THINGS: NOT AT ALL
SUM OF ALL RESPONSES TO PHQ9 QUESTIONS 1 AND 2: 0

## 2025-07-02 NOTE — PROGRESS NOTES
"Subjective   Patient ID: Zayda Dotson is a 29 y.o. female who presents for Annual Exam.    Dentist and Eye Doctor appointments:  had dentist appt; due for eye   Immunizations: UTD  Diet: nursing   Exercise:  trying to stay active, walking daily had an 8 week old  Supplements: PNV  Menstrual Cycles: post partum just starting  Sexually Active: Yes, male, condoms, no std concerns  Pregnancy History:  Cancer Screening:    Cervical: due in    Breast: n/a   Colon: n/a   Skin:spf 30    DEXA:n/a        She is seeing a therapist for her anxiety and has been on lexapro for awhile.  She feels extra anxiety post partum.  She denies any suicidal or homicidal thoughts of ideation.        Review of Systems    Current Outpatient Medications   Medication Instructions    prenatal no115/iron/folic acid (PRENATAL 19 ORAL) Take by mouth.    sertraline (ZOLOFT) 25 mg, oral, Daily, Take week #1 and #2    sertraline (ZOLOFT) 50 mg, oral, Daily, Begin week #3       RX Allergies[1]  Patient has no known allergies.    Past Medical History:  2017: Abnormal Pap smear of cervix  2023: Anxiety  2010: Depression  05/15/2025: Gestational hypertension without significant proteinuria,   postpartum ([facility])    Social History     Tobacco Use    Smoking status: Never    Smokeless tobacco: Never   Substance Use Topics    Alcohol use: Not Currently     Alcohol/week: 5.0 standard drinks of alcohol     Types: 5 Glasses of wine per week     Comment: socially       Objective     Vitals:    25 0946   BP: 122/80   Pulse: 78   Resp: 16   Temp: 36.6 °C (97.8 °F)   SpO2: 98%   Weight: 64.9 kg (143 lb)   Height: 1.676 m (5' 6\")     Patient's last menstrual period was 2025.    Physical Exam  Constitutional:       General: She is not in acute distress.     Appearance: She is well-developed. She is not diaphoretic.   HENT:      Head: Normocephalic and atraumatic.      Right Ear: Tympanic membrane normal.      Left Ear: Tympanic membrane " normal.      Nose: Nose normal.      Mouth/Throat:      Mouth: Mucous membranes are moist.   Eyes:      General: No scleral icterus.     Pupils: Pupils are equal, round, and reactive to light.   Neck:      Thyroid: No thyromegaly.      Vascular: No JVD.   Cardiovascular:      Rate and Rhythm: Normal rate and regular rhythm.      Heart sounds: Normal heart sounds. No murmur heard.     No friction rub. No gallop.   Pulmonary:      Effort: Pulmonary effort is normal. No respiratory distress.      Breath sounds: Normal breath sounds. No wheezing or rales.   Chest:      Chest wall: No tenderness.   Abdominal:      General: Bowel sounds are normal. There is no distension.      Palpations: Abdomen is soft. There is no mass.      Tenderness: There is no abdominal tenderness. There is no rebound.   Musculoskeletal:         General: Normal range of motion.      Cervical back: Normal range of motion and neck supple.   Lymphadenopathy:      Cervical: No cervical adenopathy.   Skin:     General: Skin is warm and dry.   Neurological:      General: No focal deficit present.      Mental Status: She is alert and oriented to person, place, and time.      Deep Tendon Reflexes: Reflexes normal.   Psychiatric:         Mood and Affect: Mood normal.         Behavior: Behavior normal.         Assessment/Plan   Diagnoses and all orders for this visit:  Healthcare maintenance  -     CBC and Auto Differential; Future  -     Comprehensive Metabolic Panel; Future  Post partum depression  -     sertraline (Zoloft) 25 mg tablet; Take 1 tablet (25 mg) by mouth once daily for 14 days. Take week #1 and #2  -     sertraline (Zoloft) 50 mg tablet; Take 1 tablet (50 mg) by mouth once daily. Begin week #3                 [1] No Known Allergies

## 2025-07-02 NOTE — PATIENT INSTRUCTIONS
Today we performed your Annual Physical Exam.  Your preventative health care, labs and vaccinations have been reviewed and are up to date.      Today we followed up on your anxiety and post partum mood changes. We are switching over to zoloft.  Consider CBT/therapy to help with other underlying issues if needed.  Follow up 6 weeks     Follow up in 1 year for your Complete Physical Exam

## 2025-07-24 RX ORDER — SERTRALINE HYDROCHLORIDE 50 MG/1
50 TABLET, FILM COATED ORAL DAILY
Qty: 30 TABLET | Refills: 0 | Status: SHIPPED | OUTPATIENT
Start: 2025-07-24

## 2025-07-29 ENCOUNTER — HOSPITAL ENCOUNTER (EMERGENCY)
Facility: HOSPITAL | Age: 30
Discharge: HOME | End: 2025-07-29
Attending: STUDENT IN AN ORGANIZED HEALTH CARE EDUCATION/TRAINING PROGRAM
Payer: COMMERCIAL

## 2025-07-29 ENCOUNTER — TELEMEDICINE (OUTPATIENT)
Dept: PRIMARY CARE | Facility: CLINIC | Age: 30
End: 2025-07-29
Payer: COMMERCIAL

## 2025-07-29 VITALS
DIASTOLIC BLOOD PRESSURE: 57 MMHG | RESPIRATION RATE: 17 BRPM | OXYGEN SATURATION: 98 % | BODY MASS INDEX: 22.5 KG/M2 | SYSTOLIC BLOOD PRESSURE: 121 MMHG | HEIGHT: 66 IN | HEART RATE: 82 BPM | WEIGHT: 140 LBS | TEMPERATURE: 97.2 F

## 2025-07-29 DIAGNOSIS — R10.30 LOWER ABDOMINAL PAIN: Primary | ICD-10-CM

## 2025-07-29 DIAGNOSIS — R10.84 GENERALIZED ABDOMINAL PAIN: Primary | ICD-10-CM

## 2025-07-29 DIAGNOSIS — R53.83 OTHER FATIGUE: ICD-10-CM

## 2025-07-29 LAB
ALBUMIN SERPL BCP-MCNC: 4.2 G/DL (ref 3.4–5)
ALP SERPL-CCNC: 62 U/L (ref 33–110)
ALT SERPL W P-5'-P-CCNC: 37 U/L (ref 7–45)
ANION GAP SERPL CALC-SCNC: 13 MMOL/L (ref 10–20)
AST SERPL W P-5'-P-CCNC: 36 U/L (ref 9–39)
B-HCG SERPL-ACNC: <2 MIU/ML
BASOPHILS # BLD AUTO: 0.01 X10*3/UL (ref 0–0.1)
BASOPHILS NFR BLD AUTO: 0.2 %
BILIRUB SERPL-MCNC: 0.4 MG/DL (ref 0–1.2)
BUN SERPL-MCNC: 11 MG/DL (ref 6–23)
CALCIUM SERPL-MCNC: 8.6 MG/DL (ref 8.6–10.3)
CHLORIDE SERPL-SCNC: 109 MMOL/L (ref 98–107)
CO2 SERPL-SCNC: 21 MMOL/L (ref 21–32)
CREAT SERPL-MCNC: 0.6 MG/DL (ref 0.5–1.05)
EGFRCR SERPLBLD CKD-EPI 2021: >90 ML/MIN/1.73M*2
EOSINOPHIL # BLD AUTO: 0 X10*3/UL (ref 0–0.7)
EOSINOPHIL NFR BLD AUTO: 0 %
ERYTHROCYTE [DISTWIDTH] IN BLOOD BY AUTOMATED COUNT: 13.2 % (ref 11.5–14.5)
FLUAV RNA RESP QL NAA+PROBE: NOT DETECTED
FLUBV RNA RESP QL NAA+PROBE: NOT DETECTED
GLUCOSE SERPL-MCNC: 86 MG/DL (ref 74–99)
HCT VFR BLD AUTO: 37.8 % (ref 36–46)
HGB BLD-MCNC: 12.6 G/DL (ref 12–16)
IMM GRANULOCYTES # BLD AUTO: 0.01 X10*3/UL (ref 0–0.7)
IMM GRANULOCYTES NFR BLD AUTO: 0.2 % (ref 0–0.9)
LIPASE SERPL-CCNC: 18 U/L (ref 9–82)
LYMPHOCYTES # BLD AUTO: 1.03 X10*3/UL (ref 1.2–4.8)
LYMPHOCYTES NFR BLD AUTO: 25.6 %
MCH RBC QN AUTO: 29.7 PG (ref 26–34)
MCHC RBC AUTO-ENTMCNC: 33.3 G/DL (ref 32–36)
MCV RBC AUTO: 89 FL (ref 80–100)
MONOCYTES # BLD AUTO: 0.67 X10*3/UL (ref 0.1–1)
MONOCYTES NFR BLD AUTO: 16.6 %
NEUTROPHILS # BLD AUTO: 2.31 X10*3/UL (ref 1.2–7.7)
NEUTROPHILS NFR BLD AUTO: 57.4 %
NRBC BLD-RTO: 0 /100 WBCS (ref 0–0)
PLATELET # BLD AUTO: 147 X10*3/UL (ref 150–450)
POTASSIUM SERPL-SCNC: 4.3 MMOL/L (ref 3.5–5.3)
PROT SERPL-MCNC: 7.6 G/DL (ref 6.4–8.2)
RBC # BLD AUTO: 4.24 X10*6/UL (ref 4–5.2)
SARS-COV-2 RNA RESP QL NAA+PROBE: NOT DETECTED
SODIUM SERPL-SCNC: 139 MMOL/L (ref 136–145)
WBC # BLD AUTO: 4 X10*3/UL (ref 4.4–11.3)

## 2025-07-29 PROCEDURE — 36415 COLL VENOUS BLD VENIPUNCTURE: CPT | Performed by: STUDENT IN AN ORGANIZED HEALTH CARE EDUCATION/TRAINING PROGRAM

## 2025-07-29 PROCEDURE — 85025 COMPLETE CBC W/AUTO DIFF WBC: CPT | Performed by: STUDENT IN AN ORGANIZED HEALTH CARE EDUCATION/TRAINING PROGRAM

## 2025-07-29 PROCEDURE — 80053 COMPREHEN METABOLIC PANEL: CPT | Performed by: STUDENT IN AN ORGANIZED HEALTH CARE EDUCATION/TRAINING PROGRAM

## 2025-07-29 PROCEDURE — 99214 OFFICE O/P EST MOD 30 MIN: CPT | Performed by: FAMILY MEDICINE

## 2025-07-29 PROCEDURE — 87636 SARSCOV2 & INF A&B AMP PRB: CPT | Performed by: STUDENT IN AN ORGANIZED HEALTH CARE EDUCATION/TRAINING PROGRAM

## 2025-07-29 PROCEDURE — 1036F TOBACCO NON-USER: CPT | Performed by: FAMILY MEDICINE

## 2025-07-29 PROCEDURE — 84702 CHORIONIC GONADOTROPIN TEST: CPT | Performed by: STUDENT IN AN ORGANIZED HEALTH CARE EDUCATION/TRAINING PROGRAM

## 2025-07-29 PROCEDURE — 99283 EMERGENCY DEPT VISIT LOW MDM: CPT | Performed by: STUDENT IN AN ORGANIZED HEALTH CARE EDUCATION/TRAINING PROGRAM

## 2025-07-29 PROCEDURE — 83690 ASSAY OF LIPASE: CPT | Performed by: STUDENT IN AN ORGANIZED HEALTH CARE EDUCATION/TRAINING PROGRAM

## 2025-07-29 ASSESSMENT — PAIN SCALES - GENERAL
PAINLEVEL_OUTOF10: 2
PAINLEVEL_OUTOF10: 2

## 2025-07-29 ASSESSMENT — PAIN - FUNCTIONAL ASSESSMENT
PAIN_FUNCTIONAL_ASSESSMENT: 0-10
PAIN_FUNCTIONAL_ASSESSMENT: 0-10

## 2025-07-29 ASSESSMENT — PAIN DESCRIPTION - ORIENTATION: ORIENTATION: RIGHT;LEFT;LOWER

## 2025-07-29 ASSESSMENT — LIFESTYLE VARIABLES
TOTAL SCORE: 0
HAVE PEOPLE ANNOYED YOU BY CRITICIZING YOUR DRINKING: NO
EVER HAD A DRINK FIRST THING IN THE MORNING TO STEADY YOUR NERVES TO GET RID OF A HANGOVER: NO
EVER FELT BAD OR GUILTY ABOUT YOUR DRINKING: NO
HAVE YOU EVER FELT YOU SHOULD CUT DOWN ON YOUR DRINKING: NO

## 2025-07-29 ASSESSMENT — PAIN DESCRIPTION - DESCRIPTORS: DESCRIPTORS: OTHER (COMMENT)

## 2025-07-29 ASSESSMENT — PAIN DESCRIPTION - LOCATION: LOCATION: ABDOMEN

## 2025-07-29 NOTE — PROGRESS NOTES
I performed this visit using real-time telehealth tools, including an audio/video OR telephone connection between the patient listed who was located in the STATE [facility] and myself, Jolanta Soliz (Board certified in the [facility]).At the start of the visit, I introduced myself as Dr. Nascimento and verified the patients name, , and current physical location.  If they were currently outside of the state of [place], the visit was ended and the patient was referred to alternative means for evaluation and treatment.  The patient was made aware of the limitations of the telehealth visit.  They will not be physically examined and all issues may not be appropriate for a telehealth visit.  If necessary, an in-    In preparing for this visit and writing this note, I reviewed previous electronic medical records (labs, imaging and medical charts) of the patient available in the physician portal. Significant findings which helped in decision making are recorded in this encounter charting.  All allergies were reviewed with the patient and all medications reconciled verbally with the patient at the beginning oft the visit.    Chief complaint:    I had a clogged duct on saturday the . The next day I began having other symptoms. I have a stomach ache, headache, feel hot, and fatigued. I have also felt dizzy at times.    3 days ago had clogged duct--has had them off and on for past 2.5 months since daughter was born  Familiar with them--used ibuprofen and cool compresses and nursing  On  was not feeling well--stomach ache--HA--fatigue  Same feeling yesterday--slept off and on a lot  Stomach has been the whole time but today feels worse--    Has not had full mastitis in the past  Does not feel like has symptoms of mastitis-  Not hot to the touch or lumps or clogs-  No tenderness of breast or redness of breast    No cough sneeze RN ST  +GIFFORD and felt hot off and on but no fever --has been ~98.1  Felt sensitive skin/body  aches yesterday- not today  No vomiting or diarrhea but feels nausea    Eating and drinking good--  Urination--no dysuria-- no frequency or urgency--  Sleep-- she is an ok sleeper--  This if first baby--    Stomach is worst this am  Feels in mid stomach or a little lower-- not crampy but feels hot nausea feeling-  When pushes on it feels uncomfortable but not tender    Does not feel good when she lets go  In RLQ with pressure and letting go-- does not feel good  Feels the same on the LLQ--    No pain into back--    All other ROS (-)    General appearance:  Vitals available from patient? no  Alert, oriented, pleasant, in no apparent distress? yes  Answers questions appropriately? yes  Eyes clear? yes  Is patient in respiratory distress? no  Throat exam: not available  Is patient coughing during consult: no  Audible wheezing noted? : no  Pt sounds congested?:  no  Sniffing or rhinorrhea?:  no  Tender neck LAD by pt exam?: NO  No tender LN in axilla  Psychiatric: Affect normal? yes  Other relevant physical exam:  TTP mid abdomen- not severe-- has pain with pushing and letting go  Bilateral LQ also with pain with pushing and letting go    Assessment and Plan:  Fatigue, HA, stomach pain-- stomach getting worse today  No s/s UTI  No breast changes to suggest mastitis  Concern with TTP and possible rebound in bilateral LQ-- discussed ovarian and GI possible etiologies  To ED for further evaluation and work-up  Pt agrees with plan    Discussed with patient during visit  differential diagnosis; viral versus bacterial infection;  (if relevant); use of medications prescribed and possible side effects; appropriate over-the-counter medications; possible complications ; when to follow-up for in person evaluation.  All patient's questions were answered. Patient has good decision making capacity.  They are alert to person, place, time and situation.  Patient has the ability to communicate choice, understand information,  consequences, and reason rationally.  If sent for in person care at  or ED,    I explained why Urgent in person exam was necessary and that failure to have further evaluation, and treatment today may lead to, negative outcomes, permanent disability, or even death.   If not sent for in person care today,   follow-up with your PCP in 2-3 days, sooner if not  improving.    Follow-up immediately if symptoms worsen.   If experiencing symptoms including but not limited to lethargy / chest pain / weakness / dizziness / difficulty breathing please call 911 or go to the closest emergency department for immediate care.   Limitations to telemedicine include inability to do a complete and accurate physical exam.    Any concerns regarding this were conveyed with the patient and in person follow-up recommended if the nature of their concern/illness does not progress as anticipated during this visit.

## 2025-07-29 NOTE — ED PROVIDER NOTES
Emergency Department Provider Note        History of Present Illness     History provided by: Patient  Limitations to History: None  External Records Reviewed with Brief Summary: None    HPI:  Zayda Dotson is a 29 y.o. female who is status post 12 weeks postpartum from uncomplicated spontaneous vaginal delivery of her first child with complaints of 2 to 3 days of nonlocalized abdominal discomfort accompanied by mild nausea without emesis or dry heaving.  She notes her and her  recently traveled down to [place] with her child at which time she developed what she describes as flulike symptoms as well as diffuse mild/nonlocalized abdominal pain approximately 2 out of 10 at its worst in intensity.  Additionally notes intermittent headache during this time which has essentially resolved.  She did have diarrhea earlier this morning which was her first episode.  She was seen by a telehealth provider earlier today where remote patient self exam could not tentatively rule out acute abdominal process including acute appendicitis and was suggested to present to ED for further evaluation.  Denies any other symptoms including chest pain, cough, shortness of breath, dysuria/hematuria, melena, weakness/dizziness.    Physical Exam   Triage vitals:  T    HR 84  /74  RR    O2 100 %      General: Awake, alert, in no acute distress  Eyes: Gaze conjugate.  No scleral icterus or injection  HENT: Normo-cephalic, atraumatic. No stridor  CV: Regular rate, regular rhythm. Radial pulses 2+ bilaterally  Resp: Breathing non-labored, speaking in full sentences.  Clear to auscultation bilaterally  GI: Soft, non-distended, non-tender. No rebound or guarding.  : No pelvic or adnexal pain bilaterally  MSK/Extremities: No gross bony deformities. Moving all extremities  Skin: Warm. Appropriate color  Neuro: Alert. Oriented. Face symmetric. Speech is fluent.  Gross strength and sensation intact in b/l UE and LEs  Psych: Appropriate  mood and affect    Medical Decision Making & ED Course   Medical Decision Makin y.o. female with approximately 2 days of nonlocalized abdominal pain, generalized malaise and mild nausea.  Patient denies significant fevers or chills or focal abdominal pain which makes acute appendicitis less likely.  Her clinical gestalt presents as possible viral gastroenteritis versus nonspecific URI.  She states she may have resumed her normal menstrual cycles starting last month however this was minimal spotting only, her nonlocalized abdominal pain could be related to resumption of her normal menstruation cycles, possible rule out infectious process secondary to recent childbirth.  ----  Scoring Tools Utilized: N/A    Differential diagnoses considered include but are not limited to: See [facility] above     Social Determinants of Health which Significantly Impact Care: None identified N/A    EKG Independent Interpretation: EKG interpreted by myself. Please see ED Course for full interpretation.    Independent Result Review and Interpretation: Relevant laboratory and radiographic results were reviewed and independently interpreted by myself.  As necessary, they are commented on in the ED Course.    Chronic conditions affecting the patient's care: As documented above in [facility]    The patient was discussed with the following consultants/services: None    Care Considerations: As documented above in [facility]    ED Course:  ED Course as of 25 1425   Tue 2025   1232 Patient with no davin tenderness for this provider.  No rebound or guarding.  Negative psoas and obturator.  Negative Rovsing.  Negative Rascon sign.  1 episode of diarrhea this morning.  No melena hematochezia.  No vomiting.  Normal vital signs.  No previous surgical history of the abdomen.  12 weeks postpartum with no vaginal bleeding or discharge.  No pelvic pain.  Reports very mild queasiness/discomfort in the abdomen that is generalized.  There is no focality of  it to the right lower quadrant.  She denies any urinary symptoms.  She has no flank pain.  She has no dysuria, increased urinary urgency or frequency.  No hematuria.  I advised patient clinically have a very low suspicion for appendicitis at this time.  I have very low suspicion overall for surgical emergency of the abdomen.  CBC, metabolic panel, lipase can pregnancy test and COVID flu testing to be obtained.  Given no urinary symptomatology will defer urine sample given young healthy female.  Advised patient on return precautions and should laboratory studies be reassuring she is comfortable deferring CT imaging at this time. [TL]      ED Course User Index  [TL] Oz Fajardo DO         Diagnoses as of 07/29/25 1423   Generalized abdominal pain     Disposition   As a result of the work-up, the patient was discharged home.  she was informed of her diagnosis and instructed to come back with any concerns or worsening of condition.  she and was agreeable to the plan as discussed above.  she was given the opportunity to ask questions.  All of the patient's questions were answered.    Procedures   Procedures    Patient seen and discussed with ED attending physician.    Nikolas Chaves MD  Emergency Medicine     Nikolas Chaves MD  Resident  07/29/25 [id number]

## 2025-07-29 NOTE — PATIENT INSTRUCTIONS
"To ED today for further evaluation    Please send me a MyChart message if you have any questions or concerns.  FOR NON URGENT questions only.  Allow up to 72 hours for response.    If you have prescription issues or other questions you can email   Ovidio Gudino  Digital Health Coordinator, at   [email or web address]     Rest and drink plenty of fluids    Tylenol and or motrin as needed for pain and fever (unless you have been told not to take these because of your personal medical history)    Discussed options and precautions (complaint specific and may include)  Viral versus bacterial infection; use of medications; possible side effects; appropriate over-the-counter medications; possible complications and /or when to follow-up.    if sent for in person care at  or ED,  it was explained why and failure to have \"higher level of care\" further evaluation, and treatment today may lead, but is not limited to negative outcomes, permanent disability, or even death.     All red flags requiring in person care were discussed.    If not sent for in person care today, follow-up with your PCP in 2-3 days, sooner if not  improving.    Follow-up immediately if symptoms worsen. If experiencing symptoms including but not limited to lethargy / chest pain / weakness / dizziness / difficulty breathing please call 911 or go to the emergency department for immediate care.     All patient's questions were answered. Patient has good decision making capacity.  They are alert to person, place, time and situation.  Patient has the ability to communicate choice, understand information, consequences, and reason rationally.      ____________________________________________________________________________________________________________  To connect with a new PCP please visit [email or web address] or call [phone]                          "

## 2025-08-13 ENCOUNTER — APPOINTMENT (OUTPATIENT)
Facility: CLINIC | Age: 30
End: 2025-08-13
Payer: COMMERCIAL

## 2025-08-13 VITALS
SYSTOLIC BLOOD PRESSURE: 114 MMHG | DIASTOLIC BLOOD PRESSURE: 64 MMHG | TEMPERATURE: 97.4 F | WEIGHT: 142 LBS | RESPIRATION RATE: 16 BRPM | BODY MASS INDEX: 22.92 KG/M2 | OXYGEN SATURATION: 98 % | HEART RATE: 68 BPM

## 2025-08-13 DIAGNOSIS — F41.9 ANXIETY: Primary | ICD-10-CM

## 2025-08-13 PROCEDURE — 99213 OFFICE O/P EST LOW 20 MIN: CPT | Performed by: FAMILY MEDICINE

## 2025-08-13 RX ORDER — SERTRALINE HYDROCHLORIDE 50 MG/1
50 TABLET, FILM COATED ORAL DAILY
Qty: 90 TABLET | Refills: 1 | Status: SHIPPED | OUTPATIENT
Start: 2025-08-13 | End: 2026-08-13

## 2026-02-13 ENCOUNTER — APPOINTMENT (OUTPATIENT)
Facility: CLINIC | Age: 31
End: 2026-02-13
Payer: COMMERCIAL